# Patient Record
Sex: FEMALE | Race: WHITE | NOT HISPANIC OR LATINO | URBAN - NONMETROPOLITAN AREA
[De-identification: names, ages, dates, MRNs, and addresses within clinical notes are randomized per-mention and may not be internally consistent; named-entity substitution may affect disease eponyms.]

---

## 2021-03-16 ENCOUNTER — OFFICE VISIT (OUTPATIENT)
Dept: URBAN - NONMETROPOLITAN AREA CLINIC 2 | Facility: CLINIC | Age: 76
End: 2021-03-16
Payer: MEDICARE

## 2021-03-16 DIAGNOSIS — R14.0 BLOATING: ICD-10-CM

## 2021-03-16 DIAGNOSIS — K58.0 IRRITABLE BOWEL SYNDROME WITH DIARRHEA: ICD-10-CM

## 2021-03-16 DIAGNOSIS — K57.50 DIVERTICULOSIS OF BOTH SMALL AND LARGE INTESTINE: ICD-10-CM

## 2021-03-16 PROCEDURE — 99203 OFFICE O/P NEW LOW 30 MIN: CPT | Performed by: INTERNAL MEDICINE

## 2021-03-16 PROCEDURE — 99243 OFF/OP CNSLTJ NEW/EST LOW 30: CPT | Performed by: INTERNAL MEDICINE

## 2021-03-16 RX ORDER — RIFAXIMIN 550 MG/1
1 TABLET TABLET ORAL THREE TIMES A DAY
Qty: 42 TABLET | Refills: 3 | OUTPATIENT
Start: 2021-03-16 | End: 2021-05-11

## 2021-03-16 NOTE — HPI-TODAY'S VISIT:
3-16-21 The patient is a 75-year-old female who presents today for problems with IBS and diarrhea.  She was previously being seen by Dr. Ramses Sotelo.  He did a colonoscopy on her several years ago, but biopsies were not performed at that time.  She had been maintained on Viberzi, but she felt that this was causing too much constipation.  She actually became so constipated, that she had to go to the emergency room.  She stopped the Viberzi, and she was told to take it as needed.  This was not helping with her symptoms.  She is now having several hard bowel movements followed by urgent diarrhea.  She denies any blood in her stool.  She denies any weight loss.  She has never been on a specific FODMAP diet, but she has tried to eliminate some gluten and dairy.  She feels like she tries to eat fairly healthy.  She has never been tried on dicyclomine.  She has been on amitriptyline, and this did cause her some dizziness.  She also complains of bloating and gas today.  She is not taking any fiber on a regular basis.  She does take a probiotic occasionally.  She feels that IBgard helps with her symptoms more than any of the other medications that she has tried.  Overall, she is here today for second opinion.  She has had problems with IBS and diarrhea her entire life.  She was referred to [place] by Dr. Sotelo, but this never happened due to the pandemic.  We have discussed various treatment options today in detail.  She is willing to try a trial of Xifaxan.  It does not appear that she was ever ruled out for microscopic colitis, so we have discussed this today as well.

## 2021-05-11 ENCOUNTER — OFFICE VISIT (OUTPATIENT)
Dept: URBAN - NONMETROPOLITAN AREA CLINIC 2 | Facility: CLINIC | Age: 76
End: 2021-05-11
Payer: MEDICARE

## 2021-05-11 DIAGNOSIS — R14.0 BLOATING: ICD-10-CM

## 2021-05-11 DIAGNOSIS — K58.0 IRRITABLE BOWEL SYNDROME WITH DIARRHEA: ICD-10-CM

## 2021-05-11 DIAGNOSIS — K57.90 DIVERTICULOSIS: ICD-10-CM

## 2021-05-11 DIAGNOSIS — Z12.11 SCREENING FOR COLON CANCER: ICD-10-CM

## 2021-05-11 PROCEDURE — 99214 OFFICE O/P EST MOD 30 MIN: CPT | Performed by: INTERNAL MEDICINE

## 2021-05-11 RX ORDER — LEVOTHYROXINE SODIUM 0.09 MG/1
(PRIOR AUTH#:000008937753) TABLET ORAL
Qty: 90 DELAYED RELEASE TABLET | Status: ACTIVE | COMMUNITY

## 2021-05-11 RX ORDER — PANTOPRAZOLE SODIUM 40 MG/1
(PRIOR AUTH#:000001165383) TABLET, DELAYED RELEASE ORAL
Qty: 90 DELAYED RELEASE TABLET | Status: ACTIVE | COMMUNITY

## 2021-05-11 RX ORDER — OLMESARTAN MEDOXOMIL AND HYDROCHLOROTHIAZIDE 40; 12.5 MG/1; MG/1
(PRIOR AUTH#:000009179771) TABLET ORAL
Qty: 90 DELAYED RELEASE TABLET | Status: ACTIVE | COMMUNITY

## 2021-05-11 RX ORDER — RIFAXIMIN 550 MG/1
1 TABLET TABLET ORAL THREE TIMES A DAY
Qty: 42 TABLET | Refills: 3 | Status: ON HOLD | COMMUNITY
Start: 2021-03-16 | End: 2021-05-11

## 2021-05-11 NOTE — HPI-TODAY'S VISIT:
3-16-21 The patient is a 75-year-old female who presents today for problems with IBS and diarrhea.  She was previously being seen by Dr. Ramses Sotelo.  He did a colonoscopy on her several years ago, but biopsies were not performed at that time.  She had been maintained on Viberzi, but she felt that this was causing too much constipation.  She actually became so constipated, that she had to go to the emergency room.  She stopped the Viberzi, and she was told to take it as needed.  This was not helping with her symptoms.  She is now having several hard bowel movements followed by urgent diarrhea.  She denies any blood in her stool.  She denies any weight loss.  She has never been on a specific FODMAP diet, but she has tried to eliminate some gluten and dairy.  She feels like she tries to eat fairly healthy.  She has never been tried on dicyclomine.  She has been on amitriptyline, and this did cause her some dizziness.  She also complains of bloating and gas today.  She is not taking any fiber on a regular basis.  She does take a probiotic occasionally.  She feels that IBgard helps with her symptoms more than any of the other medications that she has tried.  Overall, she is here today for second opinion.  She has had problems with IBS and diarrhea her entire life.  She was referred to [place] by Dr. Sotelo, but this never happened due to the pandemic.  We have discussed various treatment options today in detail.  She is willing to try a trial of Xifaxan.  It does not appear that she was ever ruled out for microscopic colitis, so we have discussed this today as well. 5-11-21 The patient presents today for follow-up of her IBS and diarrhea.  Since our last visit, she did take Xifaxan.  Her diarrhea has improved.  Now her stools have changed to where she is having several hard balls throughout the day.  She has multiple bowel movements throughout the day, and she never feels that she has a complete bowel movement.  She is taking 1 Metamucil tablet in the morning, but she forgets to take her Metamucil at night.  We have discussed adding MiraLAX in the morning along with increasing her Metamucil at night.  She is in agreement with this plan today.  Overall, she is feeling much better.  We have also discussed diet, but she is meeting with her primary care physician.  Her cholesterol has been elevated.

## 2021-05-14 ENCOUNTER — WEB ENCOUNTER (OUTPATIENT)
Dept: URBAN - NONMETROPOLITAN AREA CLINIC 2 | Facility: CLINIC | Age: 76
End: 2021-05-14

## 2021-05-14 RX ORDER — PANTOPRAZOLE SODIUM 40 MG/1
1 TABLET TABLET, DELAYED RELEASE ORAL ONCE A DAY
Qty: 90 TABLET | Refills: 3

## 2021-06-15 ENCOUNTER — OFFICE VISIT (OUTPATIENT)
Dept: URBAN - NONMETROPOLITAN AREA CLINIC 2 | Facility: CLINIC | Age: 76
End: 2021-06-15

## 2021-06-15 ENCOUNTER — OFFICE VISIT (OUTPATIENT)
Dept: URBAN - METROPOLITAN AREA TELEHEALTH 2 | Facility: TELEHEALTH | Age: 76
End: 2021-06-15
Payer: MEDICARE

## 2021-06-15 DIAGNOSIS — K58.9 IBS (IRRITABLE BOWEL SYNDROME): ICD-10-CM

## 2021-06-15 DIAGNOSIS — E78.5 HYPERLIPIDEMIA: ICD-10-CM

## 2021-06-15 DIAGNOSIS — R74.8 ABNORMAL LIVER ENZYMES: ICD-10-CM

## 2021-06-15 PROCEDURE — 97802 MEDICAL NUTRITION INDIV IN: CPT | Performed by: DIETITIAN, REGISTERED

## 2021-06-15 RX ORDER — LEVOTHYROXINE SODIUM 0.09 MG/1
(PRIOR AUTH#:000008937753) TABLET ORAL
Qty: 90 DELAYED RELEASE TABLET | Status: ACTIVE | COMMUNITY

## 2021-06-15 RX ORDER — PANTOPRAZOLE SODIUM 40 MG/1
1 TABLET TABLET, DELAYED RELEASE ORAL ONCE A DAY
Qty: 90 TABLET | Refills: 3 | Status: ACTIVE | COMMUNITY

## 2021-06-15 RX ORDER — OLMESARTAN MEDOXOMIL AND HYDROCHLOROTHIAZIDE 40; 12.5 MG/1; MG/1
(PRIOR AUTH#:000009179771) TABLET ORAL
Qty: 90 DELAYED RELEASE TABLET | Status: ACTIVE | COMMUNITY

## 2021-06-16 ENCOUNTER — TELEPHONE ENCOUNTER (OUTPATIENT)
Dept: URBAN - NONMETROPOLITAN AREA CLINIC 2 | Facility: CLINIC | Age: 76
End: 2021-06-16

## 2021-06-16 RX ORDER — LEVOTHYROXINE SODIUM 0.09 MG/1
(PRIOR AUTH#:000008937753) TABLET ORAL
Qty: 90 DELAYED RELEASE TABLET | Status: ACTIVE | COMMUNITY

## 2021-06-16 RX ORDER — OLMESARTAN MEDOXOMIL AND HYDROCHLOROTHIAZIDE 40; 12.5 MG/1; MG/1
(PRIOR AUTH#:000009179771) TABLET ORAL
Qty: 90 DELAYED RELEASE TABLET | Status: ACTIVE | COMMUNITY

## 2021-06-16 RX ORDER — PANTOPRAZOLE SODIUM 40 MG/1
1 TABLET TABLET, DELAYED RELEASE ORAL ONCE A DAY
Qty: 90 TABLET | Refills: 3 | Status: ACTIVE | COMMUNITY

## 2021-06-16 RX ORDER — HYDROCORTISONE ACETATE 25 MG/1
1 SUPPOSITORY SUPPOSITORY RECTAL THREE TIMES A DAY
Qty: 42 SUPPOSITORIES | Refills: 1 | OUTPATIENT
Start: 2021-06-16 | End: 2021-07-14

## 2021-07-13 ENCOUNTER — TELEPHONE ENCOUNTER (OUTPATIENT)
Dept: URBAN - METROPOLITAN AREA CLINIC 23 | Facility: CLINIC | Age: 76
End: 2021-07-13

## 2021-07-13 RX ORDER — LEVOTHYROXINE SODIUM 0.09 MG/1
(PRIOR AUTH#:000008937753) TABLET ORAL
Qty: 90 DELAYED RELEASE TABLET | Status: ACTIVE | COMMUNITY

## 2021-07-13 RX ORDER — OLMESARTAN MEDOXOMIL AND HYDROCHLOROTHIAZIDE 40; 12.5 MG/1; MG/1
(PRIOR AUTH#:000009179771) TABLET ORAL
Qty: 90 DELAYED RELEASE TABLET | Status: ACTIVE | COMMUNITY

## 2021-07-13 RX ORDER — PANTOPRAZOLE SODIUM 40 MG/1
1 TABLET TABLET, DELAYED RELEASE ORAL ONCE A DAY
Qty: 90 TABLET | Refills: 3 | Status: ACTIVE | COMMUNITY

## 2021-07-13 RX ORDER — RIFAXIMIN 550 MG/1
1 TABLET TABLET ORAL THREE TIMES A DAY
Qty: 42 TABLET | Refills: 2 | OUTPATIENT
Start: 2021-07-14 | End: 2021-08-25

## 2021-07-13 RX ORDER — HYDROCORTISONE ACETATE 25 MG/1
1 SUPPOSITORY SUPPOSITORY RECTAL THREE TIMES A DAY
Qty: 42 SUPPOSITORIES | Refills: 1 | Status: ACTIVE | COMMUNITY
Start: 2021-06-16 | End: 2021-07-14

## 2021-07-20 ENCOUNTER — OFFICE VISIT (OUTPATIENT)
Dept: URBAN - NONMETROPOLITAN AREA CLINIC 2 | Facility: CLINIC | Age: 76
End: 2021-07-20

## 2021-07-20 ENCOUNTER — TELEPHONE ENCOUNTER (OUTPATIENT)
Dept: URBAN - NONMETROPOLITAN AREA CLINIC 2 | Facility: CLINIC | Age: 76
End: 2021-07-20

## 2021-07-20 RX ORDER — RIFAXIMIN 550 MG/1
1 TABLET TABLET ORAL THREE TIMES A DAY
Qty: 42 TABLET | Refills: 2
Start: 2021-07-14 | End: 2021-08-31

## 2021-09-09 ENCOUNTER — OFFICE VISIT (OUTPATIENT)
Dept: URBAN - NONMETROPOLITAN AREA CLINIC 2 | Facility: CLINIC | Age: 76
End: 2021-09-09
Payer: MEDICARE

## 2021-09-09 ENCOUNTER — WEB ENCOUNTER (OUTPATIENT)
Dept: URBAN - NONMETROPOLITAN AREA CLINIC 2 | Facility: CLINIC | Age: 76
End: 2021-09-09

## 2021-09-09 VITALS
SYSTOLIC BLOOD PRESSURE: 121 MMHG | DIASTOLIC BLOOD PRESSURE: 74 MMHG | TEMPERATURE: 97.6 F | BODY MASS INDEX: 19.29 KG/M2 | HEART RATE: 68 BPM | HEIGHT: 64 IN | WEIGHT: 113 LBS

## 2021-09-09 DIAGNOSIS — Z12.11 SCREENING FOR COLON CANCER: ICD-10-CM

## 2021-09-09 DIAGNOSIS — K57.30 ACQUIRED DIVERTICULOSIS OF COLON: ICD-10-CM

## 2021-09-09 DIAGNOSIS — R14.0 BLOATING: ICD-10-CM

## 2021-09-09 DIAGNOSIS — K58.0 IRRITABLE BOWEL SYNDROME WITH DIARRHEA: ICD-10-CM

## 2021-09-09 DIAGNOSIS — K57.90 DIVERTICULOSIS: ICD-10-CM

## 2021-09-09 PROCEDURE — 99214 OFFICE O/P EST MOD 30 MIN: CPT | Performed by: NURSE PRACTITIONER

## 2021-09-09 RX ORDER — OLMESARTAN MEDOXOMIL AND HYDROCHLOROTHIAZIDE 40; 12.5 MG/1; MG/1
(PRIOR AUTH#:000009179771) TABLET ORAL
Qty: 90 DELAYED RELEASE TABLET | Status: ACTIVE | COMMUNITY

## 2021-09-09 RX ORDER — AMITRIPTYLINE HYDROCHLORIDE 10 MG/1
1 TABLET AT BEDTIME TABLET, FILM COATED ORAL ONCE A DAY
Qty: 90 TABLET | Refills: 3 | OUTPATIENT
Start: 2021-09-09

## 2021-09-09 RX ORDER — LEVOTHYROXINE SODIUM 0.09 MG/1
(PRIOR AUTH#:000008937753) TABLET ORAL
Qty: 90 DELAYED RELEASE TABLET | Status: ACTIVE | COMMUNITY

## 2021-09-09 RX ORDER — PANTOPRAZOLE SODIUM 40 MG/1
1 TABLET TABLET, DELAYED RELEASE ORAL ONCE A DAY
Qty: 90 TABLET | Refills: 3 | Status: ACTIVE | COMMUNITY

## 2021-09-09 NOTE — HPI-TODAY'S VISIT:
3-16-21 The patient is a 75-year-old female who presents today for problems with IBS and diarrhea.  She was previously being seen by Dr. Ramses Sotelo.  He did a colonoscopy on her several years ago, but biopsies were not performed at that time.  She had been maintained on Viberzi, but she felt that this was causing too much constipation.  She actually became so constipated, that she had to go to the emergency room.  She stopped the Viberzi, and she was told to take it as needed.  This was not helping with her symptoms.  She is now having several hard bowel movements followed by urgent diarrhea.  She denies any blood in her stool.  She denies any weight loss.  She has never been on a specific FODMAP diet, but she has tried to eliminate some gluten and dairy.  She feels like she tries to eat fairly healthy.  She has never been tried on dicyclomine.  She has been on amitriptyline, and this did cause her some dizziness.  She also complains of bloating and gas today.  She is not taking any fiber on a regular basis.  She does take a probiotic occasionally.  She feels that IBgard helps with her symptoms more than any of the other medications that she has tried.  Overall, she is here today for second opinion.  She has had problems with IBS and diarrhea her entire life.  She was referred to [place] by Dr. Sotelo, but this never happened due to the pandemic.  We have discussed various treatment options today in detail.  She is willing to try a trial of Xifaxan.  It does not appear that she was ever ruled out for microscopic colitis, so we have discussed this today as well. 5-11-21 The patient presents today for follow-up of her IBS and diarrhea.  Since our last visit, she did take Xifaxan.  Her diarrhea has improved.  Now her stools have changed to where she is having several hard balls throughout the day.  She has multiple bowel movements throughout the day, and she never feels that she has a complete bowel movement.  She is taking 1 Metamucil tablet in the morning, but she forgets to take her Metamucil at night.  We have discussed adding MiraLAX in the morning along with increasing her Metamucil at night.  She is in agreement with this plan today.  Overall, she is feeling much better.  We have also discussed diet, but she is meeting with her primary care physician.  Her cholesterol has been elevated.  9/9/2021 Mrs. Truong presents for follow-up with IBS.  Since her last visit she started Metamucil twice daily and MiraLAX daily.  She subsequently developed urgent diffuse diarrhea with up to 10 bowel movements daily earlier this week.  She stopped the MiraLAX and took Imodium and did not have a bowel movement today.  She is taken Xifaxan twice and did not feel that it helped significantly.  She has been food journaling with no identifiable trigger to her symptoms.  Today we have had a long discussion regarding her symptoms.  Her IBS truly seems mixed.  She is taking her fiber daily.  She has never tried amitriptyline in the past.  She agrees to pursue colonoscopy if no relief.  MB

## 2021-09-14 ENCOUNTER — OFFICE VISIT (OUTPATIENT)
Dept: URBAN - NONMETROPOLITAN AREA CLINIC 2 | Facility: CLINIC | Age: 76
End: 2021-09-14

## 2021-11-02 ENCOUNTER — TELEPHONE ENCOUNTER (OUTPATIENT)
Dept: URBAN - NONMETROPOLITAN AREA CLINIC 13 | Facility: CLINIC | Age: 76
End: 2021-11-02

## 2021-11-10 ENCOUNTER — WEB ENCOUNTER (OUTPATIENT)
Dept: URBAN - NONMETROPOLITAN AREA CLINIC 2 | Facility: CLINIC | Age: 76
End: 2021-11-10

## 2021-11-10 RX ORDER — AMITRIPTYLINE HYDROCHLORIDE 25 MG/1
1 TABLET AT BEDTIME TABLET, FILM COATED ORAL ONCE A DAY
Qty: 90 TABLET | Refills: 3
Start: 2021-09-09

## 2022-02-22 ENCOUNTER — OFFICE VISIT (OUTPATIENT)
Dept: URBAN - NONMETROPOLITAN AREA CLINIC 2 | Facility: CLINIC | Age: 77
End: 2022-02-22

## 2022-02-24 ENCOUNTER — TELEPHONE ENCOUNTER (OUTPATIENT)
Dept: URBAN - NONMETROPOLITAN AREA CLINIC 2 | Facility: CLINIC | Age: 77
End: 2022-02-24

## 2022-02-24 RX ORDER — LEVOTHYROXINE SODIUM 0.09 MG/1
(PRIOR AUTH#:000008937753) TABLET ORAL
Qty: 90 DELAYED RELEASE TABLET | Status: ACTIVE | COMMUNITY

## 2022-02-24 RX ORDER — PANTOPRAZOLE SODIUM 40 MG/1
1 TABLET TABLET, DELAYED RELEASE ORAL ONCE A DAY
Qty: 90 TABLET | Refills: 3 | Status: ACTIVE | COMMUNITY

## 2022-02-24 RX ORDER — OLMESARTAN MEDOXOMIL AND HYDROCHLOROTHIAZIDE 40; 12.5 MG/1; MG/1
(PRIOR AUTH#:000009179771) TABLET ORAL
Qty: 90 DELAYED RELEASE TABLET | Status: ACTIVE | COMMUNITY

## 2022-02-24 RX ORDER — HYDROCORTISONE 25 MG/G
1 APPLICATION CREAM TOPICAL TWICE A DAY
Qty: 1 TUBE | Refills: 1 | OUTPATIENT
Start: 2022-02-24 | End: 2022-03-24

## 2022-02-24 RX ORDER — AMITRIPTYLINE HYDROCHLORIDE 25 MG/1
1 TABLET AT BEDTIME TABLET, FILM COATED ORAL ONCE A DAY
Qty: 90 TABLET | Refills: 3 | Status: ACTIVE | COMMUNITY
Start: 2021-09-09

## 2022-02-25 ENCOUNTER — OFFICE VISIT (OUTPATIENT)
Dept: URBAN - NONMETROPOLITAN AREA CLINIC 13 | Facility: CLINIC | Age: 77
End: 2022-02-25

## 2022-03-15 ENCOUNTER — LAB OUTSIDE AN ENCOUNTER (OUTPATIENT)
Dept: URBAN - NONMETROPOLITAN AREA CLINIC 2 | Facility: CLINIC | Age: 77
End: 2022-03-15

## 2022-03-15 ENCOUNTER — OFFICE VISIT (OUTPATIENT)
Dept: URBAN - NONMETROPOLITAN AREA CLINIC 2 | Facility: CLINIC | Age: 77
End: 2022-03-15
Payer: MEDICARE

## 2022-03-15 DIAGNOSIS — K57.30 ACQUIRED DIVERTICULOSIS OF COLON: ICD-10-CM

## 2022-03-15 DIAGNOSIS — K58.0 IRRITABLE BOWEL SYNDROME WITH DIARRHEA: ICD-10-CM

## 2022-03-15 DIAGNOSIS — Z12.11 SCREENING FOR COLON CANCER: ICD-10-CM

## 2022-03-15 DIAGNOSIS — R14.0 BLOATING: ICD-10-CM

## 2022-03-15 PROCEDURE — 99214 OFFICE O/P EST MOD 30 MIN: CPT | Performed by: NURSE PRACTITIONER

## 2022-03-15 RX ORDER — ROSUVASTATIN CALCIUM 5 MG/1
1 TABLET TABLET, FILM COATED ORAL ONCE A DAY
Status: ACTIVE | COMMUNITY

## 2022-03-15 RX ORDER — AMITRIPTYLINE HYDROCHLORIDE 25 MG/1
1 TABLET AT BEDTIME TABLET, FILM COATED ORAL ONCE A DAY
Qty: 90 TABLET | Refills: 3 | Status: ACTIVE | COMMUNITY
Start: 2021-09-09

## 2022-03-15 RX ORDER — HYDROCORTISONE 25 MG/G
1 APPLICATION CREAM TOPICAL TWICE A DAY
Qty: 1 TUBE | Refills: 1 | Status: ACTIVE | COMMUNITY
Start: 2022-02-24 | End: 2022-03-24

## 2022-03-15 RX ORDER — OLMESARTAN MEDOXOMIL AND HYDROCHLOROTHIAZIDE 40; 12.5 MG/1; MG/1
(PRIOR AUTH#:000009179771) TABLET ORAL
Qty: 90 DELAYED RELEASE TABLET | Status: ACTIVE | COMMUNITY

## 2022-03-15 RX ORDER — LEVOTHYROXINE SODIUM 0.09 MG/1
(PRIOR AUTH#:000008937753) TABLET ORAL
Qty: 90 DELAYED RELEASE TABLET | Status: ACTIVE | COMMUNITY

## 2022-03-15 RX ORDER — PANTOPRAZOLE SODIUM 40 MG/1
1 TABLET TABLET, DELAYED RELEASE ORAL ONCE A DAY
Qty: 90 TABLET | Refills: 3 | Status: ACTIVE | COMMUNITY

## 2022-03-15 RX ORDER — SODIUM PICOSULFATE, MAGNESIUM OXIDE, AND ANHYDROUS CITRIC ACID 10; 3.5; 12 MG/160ML; G/160ML; G/160ML
160 ML LIQUID ORAL
Qty: 320 MILLILITER | Refills: 0 | OUTPATIENT
Start: 2022-03-15 | End: 2022-03-16

## 2022-03-15 NOTE — HPI-TODAY'S VISIT:
3-16-21 The patient is a 75-year-old female who presents today for problems with IBS and diarrhea.  She was previously being seen by Dr. Ramses Sotelo.  He did a colonoscopy on her several years ago, but biopsies were not performed at that time.  She had been maintained on Viberzi, but she felt that this was causing too much constipation.  She actually became so constipated, that she had to go to the emergency room.  She stopped the Viberzi, and she was told to take it as needed.  This was not helping with her symptoms.  She is now having several hard bowel movements followed by urgent diarrhea.  She denies any blood in her stool.  She denies any weight loss.  She has never been on a specific FODMAP diet, but she has tried to eliminate some gluten and dairy.  She feels like she tries to eat fairly healthy.  She has never been tried on dicyclomine.  She has been on amitriptyline, and this did cause her some dizziness.  She also complains of bloating and gas today.  She is not taking any fiber on a regular basis.  She does take a probiotic occasionally.  She feels that IBgard helps with her symptoms more than any of the other medications that she has tried.  Overall, she is here today for second opinion.  She has had problems with IBS and diarrhea her entire life.  She was referred to [place] by Dr. Sotelo, but this never happened due to the pandemic.  We have discussed various treatment options today in detail.  She is willing to try a trial of Xifaxan.  It does not appear that she was ever ruled out for microscopic colitis, so we have discussed this today as well. 5-11-21 The patient presents today for follow-up of her IBS and diarrhea.  Since our last visit, she did take Xifaxan.  Her diarrhea has improved.  Now her stools have changed to where she is having several hard balls throughout the day.  She has multiple bowel movements throughout the day, and she never feels that she has a complete bowel movement.  She is taking 1 Metamucil tablet in the morning, but she forgets to take her Metamucil at night.  We have discussed adding MiraLAX in the morning along with increasing her Metamucil at night.  She is in agreement with this plan today.  Overall, she is feeling much better.  We have also discussed diet, but she is meeting with her primary care physician.  Her cholesterol has been elevated.  9/9/2021 Mrs. Truong presents for follow-up with IBS.  Since her last visit she started Metamucil twice daily and MiraLAX daily.  She subsequently developed urgent diffuse diarrhea with up to 10 bowel movements daily earlier this week.  She stopped the MiraLAX and took Imodium and did not have a bowel movement today.  She is taken Xifaxan twice and did not feel that it helped significantly.  She has been food journaling with no identifiable trigger to her symptoms.  Today we have had a long discussion regarding her symptoms.  Her IBS truly seems mixed.  She is taking her fiber daily.  She has never tried amitriptyline in the past.  She agrees to pursue colonoscopy if no relief.  MB 3/15/2022 Marisel presents for follow-up of irritable bowel syndrome with diarrhea predominance.  Since her last visit she started the amitriptyline 10 mg at night and we actually increased to 25 mg nightly.  She continues to have multiple urgent bowel movements usually in the morning.  She has failed Xifaxan, dicyclomine, Levsin in the past.  She is on Florastor daily.  Today we discussed her differential, I am concerned about microscopic colitis.  She agrees to repeat blood work and colonoscopy with random biopsies.  MB

## 2022-03-17 ENCOUNTER — TELEPHONE ENCOUNTER (OUTPATIENT)
Dept: URBAN - METROPOLITAN AREA CLINIC 92 | Facility: CLINIC | Age: 77
End: 2022-03-17

## 2022-03-17 LAB
A/G RATIO: 1.8
ALBUMIN: 5.1
ALKALINE PHOSPHATASE: 60
ALT (SGPT): 19
AST (SGOT): 26
BASO (ABSOLUTE): 0
BASOS: 1
BILIRUBIN, TOTAL: 0.4
BUN/CREATININE RATIO: 18
BUN: 13
C-REACTIVE PROTEIN, QUANT: <1
CALCIUM: 9.9
CARBON DIOXIDE, TOTAL: 25
CHLORIDE: 99
CREATININE: 0.71
DEAMIDATED GLIADIN ABS, IGA: 4
DEAMIDATED GLIADIN ABS, IGG: 3
EGFR: 89
ENDOMYSIAL ANTIBODY IGA: NEGATIVE
EOS (ABSOLUTE): 0.1
EOS: 1
GLOBULIN, TOTAL: 2.8
GLUCOSE: 94
H PYLORI, IGM ABS: <9
H. PYLORI, IGA ABS: <9
HEMATOCRIT: 39.6
HEMATOLOGY COMMENTS:: (no result)
HEMOGLOBIN: 13.2
IMMATURE CELLS: (no result)
IMMATURE GRANS (ABS): 0
IMMATURE GRANULOCYTES: 0
IMMUNOGLOBULIN A, QN, SERUM: 206
LYMPHS (ABSOLUTE): 1.8
LYMPHS: 38
MCH: 31.7
MCHC: 33.3
MCV: 95
MONOCYTES(ABSOLUTE): 0.3
MONOCYTES: 7
NEUTROPHILS (ABSOLUTE): 2.5
NEUTROPHILS: 53
NRBC: (no result)
PLATELETS: 171
POTASSIUM: 4.2
PROTEIN, TOTAL: 7.9
RBC: 4.16
RDW: 11.9
SEDIMENTATION RATE-WESTERGREN: 2
SODIUM: 141
T-TRANSGLUTAMINASE (TTG) IGA: <2
T-TRANSGLUTAMINASE (TTG) IGG: <2
T4,FREE(DIRECT): 1.26
TSH: 0.4
WBC: 4.7

## 2022-03-21 ENCOUNTER — OFFICE VISIT (OUTPATIENT)
Dept: URBAN - NONMETROPOLITAN AREA SURGERY CENTER 1 | Facility: SURGERY CENTER | Age: 77
End: 2022-03-21
Payer: MEDICARE

## 2022-03-21 ENCOUNTER — CLAIMS CREATED FROM THE CLAIM WINDOW (OUTPATIENT)
Dept: URBAN - METROPOLITAN AREA CLINIC 4 | Facility: CLINIC | Age: 77
End: 2022-03-21
Payer: MEDICARE

## 2022-03-21 DIAGNOSIS — R19.7 ACUTE DIARRHEA: ICD-10-CM

## 2022-03-21 DIAGNOSIS — K63.89 CYST OF DUODENUM: ICD-10-CM

## 2022-03-21 PROCEDURE — G8907 PT DOC NO EVENTS ON DISCHARG: HCPCS | Performed by: INTERNAL MEDICINE

## 2022-03-21 PROCEDURE — 88313 SPECIAL STAINS GROUP 2: CPT | Performed by: PATHOLOGY

## 2022-03-21 PROCEDURE — 88342 IMHCHEM/IMCYTCHM 1ST ANTB: CPT | Performed by: PATHOLOGY

## 2022-03-21 PROCEDURE — 88305 TISSUE EXAM BY PATHOLOGIST: CPT | Performed by: PATHOLOGY

## 2022-03-21 PROCEDURE — 45380 COLONOSCOPY AND BIOPSY: CPT | Performed by: INTERNAL MEDICINE

## 2022-04-29 ENCOUNTER — OFFICE VISIT (OUTPATIENT)
Dept: URBAN - NONMETROPOLITAN AREA CLINIC 2 | Facility: CLINIC | Age: 77
End: 2022-04-29
Payer: MEDICARE

## 2022-04-29 ENCOUNTER — LAB OUTSIDE AN ENCOUNTER (OUTPATIENT)
Dept: URBAN - NONMETROPOLITAN AREA CLINIC 2 | Facility: CLINIC | Age: 77
End: 2022-04-29

## 2022-04-29 VITALS
BODY MASS INDEX: 19.29 KG/M2 | TEMPERATURE: 97.6 F | SYSTOLIC BLOOD PRESSURE: 145 MMHG | WEIGHT: 113 LBS | HEART RATE: 80 BPM | HEIGHT: 64 IN | DIASTOLIC BLOOD PRESSURE: 79 MMHG

## 2022-04-29 DIAGNOSIS — K57.90 DIVERTICULOSIS: ICD-10-CM

## 2022-04-29 DIAGNOSIS — R14.0 BLOATING: ICD-10-CM

## 2022-04-29 DIAGNOSIS — Z12.11 SCREENING FOR COLON CANCER: ICD-10-CM

## 2022-04-29 DIAGNOSIS — K58.0 IRRITABLE BOWEL SYNDROME WITH DIARRHEA: ICD-10-CM

## 2022-04-29 DIAGNOSIS — R10.9 ABDOMINAL CRAMPING: ICD-10-CM

## 2022-04-29 PROCEDURE — 99214 OFFICE O/P EST MOD 30 MIN: CPT | Performed by: NURSE PRACTITIONER

## 2022-04-29 RX ORDER — AMITRIPTYLINE HYDROCHLORIDE 25 MG/1
1 TABLET AT BEDTIME TABLET, FILM COATED ORAL ONCE A DAY
Qty: 90 TABLET | Refills: 3 | Status: ACTIVE | COMMUNITY
Start: 2021-09-09

## 2022-04-29 RX ORDER — ROSUVASTATIN CALCIUM 5 MG/1
1 TABLET TABLET, FILM COATED ORAL ONCE A DAY
Status: ACTIVE | COMMUNITY

## 2022-04-29 RX ORDER — AMITRIPTYLINE HYDROCHLORIDE 25 MG/1
1 TABLET AT BEDTIME TABLET, FILM COATED ORAL ONCE A DAY
Qty: 90 TABLET | Refills: 3
Start: 2021-09-09

## 2022-04-29 RX ORDER — OLMESARTAN MEDOXOMIL AND HYDROCHLOROTHIAZIDE 40; 12.5 MG/1; MG/1
(PRIOR AUTH#:000009179771) TABLET ORAL
Qty: 90 DELAYED RELEASE TABLET | Status: ACTIVE | COMMUNITY

## 2022-04-29 RX ORDER — PANTOPRAZOLE SODIUM 40 MG/1
1 TABLET TABLET, DELAYED RELEASE ORAL ONCE A DAY
Qty: 90 TABLET | Refills: 3 | Status: ACTIVE | COMMUNITY

## 2022-04-29 RX ORDER — LEVOTHYROXINE SODIUM 0.09 MG/1
(PRIOR AUTH#:000008937753) TABLET ORAL
Qty: 90 DELAYED RELEASE TABLET | Status: ACTIVE | COMMUNITY

## 2022-04-29 NOTE — HPI-TODAY'S VISIT:
3-16-21 The patient is a 75-year-old female who presents today for problems with IBS and diarrhea.  She was previously being seen by Dr. Ramses Sotelo.  He did a colonoscopy on her several years ago, but biopsies were not performed at that time.  She had been maintained on Viberzi, but she felt that this was causing too much constipation.  She actually became so constipated, that she had to go to the emergency room.  She stopped the Viberzi, and she was told to take it as needed.  This was not helping with her symptoms.  She is now having several hard bowel movements followed by urgent diarrhea.  She denies any blood in her stool.  She denies any weight loss.  She has never been on a specific FODMAP diet, but she has tried to eliminate some gluten and dairy.  She feels like she tries to eat fairly healthy.  She has never been tried on dicyclomine.  She has been on amitriptyline, and this did cause her some dizziness.  She also complains of bloating and gas today.  She is not taking any fiber on a regular basis.  She does take a probiotic occasionally.  She feels that IBgard helps with her symptoms more than any of the other medications that she has tried.  Overall, she is here today for second opinion.  She has had problems with IBS and diarrhea her entire life.  She was referred to [place] by Dr. Sotelo, but this never happened due to the pandemic.  We have discussed various treatment options today in detail.  She is willing to try a trial of Xifaxan.  It does not appear that she was ever ruled out for microscopic colitis, so we have discussed this today as well. 5-11-21 The patient presents today for follow-up of her IBS and diarrhea.  Since our last visit, she did take Xifaxan.  Her diarrhea has improved.  Now her stools have changed to where she is having several hard balls throughout the day.  She has multiple bowel movements throughout the day, and she never feels that she has a complete bowel movement.  She is taking 1 Metamucil tablet in the morning, but she forgets to take her Metamucil at night.  We have discussed adding MiraLAX in the morning along with increasing her Metamucil at night.  She is in agreement with this plan today.  Overall, she is feeling much better.  We have also discussed diet, but she is meeting with her primary care physician.  Her cholesterol has been elevated.  9/9/2021 Mrs. Truong presents for follow-up with IBS.  Since her last visit she started Metamucil twice daily and MiraLAX daily.  She subsequently developed urgent diffuse diarrhea with up to 10 bowel movements daily earlier this week.  She stopped the MiraLAX and took Imodium and did not have a bowel movement today.  She is taken Xifaxan twice and did not feel that it helped significantly.  She has been food journaling with no identifiable trigger to her symptoms.  Today we have had a long discussion regarding her symptoms.  Her IBS truly seems mixed.  She is taking her fiber daily.  She has never tried amitriptyline in the past.  She agrees to pursue colonoscopy if no relief.  MB 3/15/2022 Marisel presents for follow-up of irritable bowel syndrome with diarrhea predominance.  Since her last visit she started the amitriptyline 10 mg at night and we actually increased to 25 mg nightly.  She continues to have multiple urgent bowel movements usually in the morning.  She has failed Xifaxan, dicyclomine, Levsin in the past.  She is on Florastor daily.  Today we discussed her differential, I am concerned about microscopic colitis.  She agrees to repeat blood work and colonoscopy with random biopsies.  MB 4/29/2022 Mrs. Truong presents for follow-up of bloating, abdominal pain, and alternating constipation and diarrhea.  Since her last visit her repeat colonoscopy is normal with left-sided diverticular disease and normal random biopsies.  She is on amitriptyline 25 mg at night.  She states at times her stools are hard, she would then have these episodes of abdominal cramping with urgency and diarrhea.  Imodium does help but this seems to wipe her out for the day.  She is failed multiple medications in the past, she states that she cannot tolerate psyllium.  Viberzi helped in the distant past with Dr. Sotelo but this ultimately caused constipation and abdominal pain.  She does still have her gallbladder.  Her last EGD was done by Dr. Sotelo several years ago.  She has increased bloating as well.  She is on pantoprazole 40 mg daily.  She also takes olmesartan.  Today we have discussed her differential.  I suspect she still has a component of IBS mixed particularly since the amitriptyline 25 mg at night is the only thing that is ever helped her abdominal pain.  Her labs are normal, her colonoscopy is normal.  She declines repeat upper endoscopy for today.  She does agree to an ultrasound of her gallbladder.  She is concerned about EPI, we have discussed checking her stool studies for parasites and fecal elastase.  If her work-up is normal she would like to repeat a course of Xifaxan however she cannot tolerate 14 days, we did discuss a trial of a 3-day course.  Today she is doing about the same.  She continues to climb plaint of abdominal pain cramping and bowel regularity.  MB

## 2022-05-11 ENCOUNTER — TELEPHONE ENCOUNTER (OUTPATIENT)
Dept: URBAN - METROPOLITAN AREA CLINIC 92 | Facility: CLINIC | Age: 77
End: 2022-05-11

## 2022-05-11 LAB
ANTIMICROBIAL SUSCEPTIBILITY: (no result)
CAMPYLOBACTER CULTURE: (no result)
E COLI SHIGA TOXIN EIA: NEGATIVE
GIARDIA LAMBLIA AG, EIA: NEGATIVE
Lab: (no result)
OVA + PARASITE EXAM: (no result)
PANCREATIC ELASTASE, FECAL: 182
SALMONELLA/SHIGELLA SCREEN: (no result)
WHITE BLOOD CELLS (WBC), STOOL: (no result)

## 2022-05-11 RX ORDER — OLMESARTAN MEDOXOMIL AND HYDROCHLOROTHIAZIDE 40; 12.5 MG/1; MG/1
(PRIOR AUTH#:000009179771) TABLET ORAL
Qty: 90 DELAYED RELEASE TABLET | Status: ACTIVE | COMMUNITY

## 2022-05-11 RX ORDER — LEVOTHYROXINE SODIUM 0.09 MG/1
(PRIOR AUTH#:000008937753) TABLET ORAL
Qty: 90 DELAYED RELEASE TABLET | Status: ACTIVE | COMMUNITY

## 2022-05-11 RX ORDER — PANCRELIPASE 36000; 180000; 114000 [USP'U]/1; [USP'U]/1; [USP'U]/1
AS DIRECTED CAPSULE, DELAYED RELEASE PELLETS ORAL
Qty: 720 CAPSULES | Refills: 3 | OUTPATIENT
Start: 2022-05-11 | End: 2023-05-06

## 2022-05-11 RX ORDER — AMITRIPTYLINE HYDROCHLORIDE 25 MG/1
1 TABLET AT BEDTIME TABLET, FILM COATED ORAL ONCE A DAY
Qty: 90 TABLET | Refills: 3 | Status: ACTIVE | COMMUNITY
Start: 2021-09-09

## 2022-05-11 RX ORDER — ROSUVASTATIN CALCIUM 5 MG/1
1 TABLET TABLET, FILM COATED ORAL ONCE A DAY
Status: ACTIVE | COMMUNITY

## 2022-05-12 ENCOUNTER — WEB ENCOUNTER (OUTPATIENT)
Dept: URBAN - NONMETROPOLITAN AREA CLINIC 2 | Facility: CLINIC | Age: 77
End: 2022-05-12

## 2022-05-24 ENCOUNTER — WEB ENCOUNTER (OUTPATIENT)
Dept: URBAN - NONMETROPOLITAN AREA CLINIC 2 | Facility: CLINIC | Age: 77
End: 2022-05-24

## 2022-05-26 ENCOUNTER — WEB ENCOUNTER (OUTPATIENT)
Dept: URBAN - NONMETROPOLITAN AREA CLINIC 2 | Facility: CLINIC | Age: 77
End: 2022-05-26

## 2022-05-27 ENCOUNTER — WEB ENCOUNTER (OUTPATIENT)
Dept: URBAN - NONMETROPOLITAN AREA CLINIC 2 | Facility: CLINIC | Age: 77
End: 2022-05-27

## 2022-06-02 ENCOUNTER — WEB ENCOUNTER (OUTPATIENT)
Dept: URBAN - NONMETROPOLITAN AREA CLINIC 2 | Facility: CLINIC | Age: 77
End: 2022-06-02

## 2022-06-03 ENCOUNTER — WEB ENCOUNTER (OUTPATIENT)
Dept: URBAN - NONMETROPOLITAN AREA CLINIC 2 | Facility: CLINIC | Age: 77
End: 2022-06-03

## 2022-06-08 ENCOUNTER — TELEPHONE ENCOUNTER (OUTPATIENT)
Dept: URBAN - METROPOLITAN AREA CLINIC 23 | Facility: CLINIC | Age: 77
End: 2022-06-08

## 2022-06-27 ENCOUNTER — WEB ENCOUNTER (OUTPATIENT)
Dept: URBAN - NONMETROPOLITAN AREA CLINIC 2 | Facility: CLINIC | Age: 77
End: 2022-06-27

## 2022-06-27 RX ORDER — OLMESARTAN MEDOXOMIL AND HYDROCHLOROTHIAZIDE 40; 12.5 MG/1; MG/1
(PRIOR AUTH#:000009179771) TABLET ORAL
Qty: 90 DELAYED RELEASE TABLET | Status: ACTIVE | COMMUNITY

## 2022-06-27 RX ORDER — LEVOTHYROXINE SODIUM 0.09 MG/1
(PRIOR AUTH#:000008937753) TABLET ORAL
Qty: 90 DELAYED RELEASE TABLET | Status: ACTIVE | COMMUNITY

## 2022-06-27 RX ORDER — PANCRELIPASE 36000; 180000; 114000 [USP'U]/1; [USP'U]/1; [USP'U]/1
AS DIRECTED CAPSULE, DELAYED RELEASE PELLETS ORAL
Qty: 720 CAPSULES | Refills: 3 | Status: ACTIVE | COMMUNITY
Start: 2022-05-11 | End: 2023-05-06

## 2022-06-27 RX ORDER — AMITRIPTYLINE HYDROCHLORIDE 25 MG/1
1 TABLET AT BEDTIME TABLET, FILM COATED ORAL ONCE A DAY
Qty: 90 TABLET | Refills: 3 | Status: ACTIVE | COMMUNITY
Start: 2021-09-09

## 2022-06-27 RX ORDER — RIFAXIMIN 550 MG/1
1 TABLET TABLET ORAL THREE TIMES A DAY
Qty: 42 TABLET | Refills: 3 | OUTPATIENT
Start: 2022-06-28 | End: 2022-08-23

## 2022-06-27 RX ORDER — ROSUVASTATIN CALCIUM 5 MG/1
1 TABLET TABLET, FILM COATED ORAL ONCE A DAY
Status: ACTIVE | COMMUNITY

## 2022-06-28 ENCOUNTER — WEB ENCOUNTER (OUTPATIENT)
Dept: URBAN - NONMETROPOLITAN AREA CLINIC 2 | Facility: CLINIC | Age: 77
End: 2022-06-28

## 2022-06-30 ENCOUNTER — TELEPHONE ENCOUNTER (OUTPATIENT)
Dept: URBAN - NONMETROPOLITAN AREA CLINIC 2 | Facility: CLINIC | Age: 77
End: 2022-06-30

## 2022-07-01 ENCOUNTER — TELEPHONE ENCOUNTER (OUTPATIENT)
Dept: URBAN - NONMETROPOLITAN AREA CLINIC 13 | Facility: CLINIC | Age: 77
End: 2022-07-01

## 2022-07-01 RX ORDER — RIFAXIMIN 550 MG/1
1 TABLET TABLET ORAL THREE TIMES A DAY
Qty: 42 TABLET | Refills: 3
Start: 2022-06-28 | End: 2022-08-26

## 2022-08-26 ENCOUNTER — OFFICE VISIT (OUTPATIENT)
Dept: URBAN - NONMETROPOLITAN AREA CLINIC 2 | Facility: CLINIC | Age: 77
End: 2022-08-26
Payer: MEDICARE

## 2022-08-26 VITALS
BODY MASS INDEX: 19.46 KG/M2 | HEIGHT: 64 IN | TEMPERATURE: 97 F | SYSTOLIC BLOOD PRESSURE: 176 MMHG | DIASTOLIC BLOOD PRESSURE: 97 MMHG | WEIGHT: 114 LBS | HEART RATE: 80 BPM

## 2022-08-26 DIAGNOSIS — K58.0 IRRITABLE BOWEL SYNDROME WITH DIARRHEA: ICD-10-CM

## 2022-08-26 DIAGNOSIS — Z12.11 SCREENING FOR COLON CANCER: ICD-10-CM

## 2022-08-26 DIAGNOSIS — K57.90 DIVERTICULOSIS: ICD-10-CM

## 2022-08-26 DIAGNOSIS — R14.0 BLOATING: ICD-10-CM

## 2022-08-26 DIAGNOSIS — R10.9 ABDOMINAL CRAMPING: ICD-10-CM

## 2022-08-26 PROCEDURE — 99214 OFFICE O/P EST MOD 30 MIN: CPT | Performed by: INTERNAL MEDICINE

## 2022-08-26 RX ORDER — OLMESARTAN MEDOXOMIL AND HYDROCHLOROTHIAZIDE 40; 12.5 MG/1; MG/1
(PRIOR AUTH#:000009179771) TABLET ORAL
Qty: 90 DELAYED RELEASE TABLET | Status: ACTIVE | COMMUNITY

## 2022-08-26 RX ORDER — DICYCLOMINE HYDROCHLORIDE 10 MG/1
1 CAPSULE CAPSULE ORAL THREE TIMES A DAY
Qty: 270 CAPSULE | Refills: 6 | OUTPATIENT
Start: 2022-08-26 | End: 2024-05-17

## 2022-08-26 RX ORDER — PANCRELIPASE 36000; 180000; 114000 [USP'U]/1; [USP'U]/1; [USP'U]/1
AS DIRECTED CAPSULE, DELAYED RELEASE PELLETS ORAL
Qty: 720 CAPSULES | Refills: 3 | Status: ACTIVE | COMMUNITY
Start: 2022-05-11 | End: 2023-05-06

## 2022-08-26 RX ORDER — AMITRIPTYLINE HYDROCHLORIDE 25 MG/1
1 TABLET AT BEDTIME TABLET, FILM COATED ORAL ONCE A DAY
Qty: 90 TABLET | Refills: 3 | Status: ACTIVE | COMMUNITY
Start: 2021-09-09

## 2022-08-26 RX ORDER — LEVOTHYROXINE SODIUM 0.09 MG/1
(PRIOR AUTH#:000008937753) TABLET ORAL
Qty: 90 DELAYED RELEASE TABLET | Status: ACTIVE | COMMUNITY

## 2022-08-26 RX ORDER — ROSUVASTATIN CALCIUM 5 MG/1
1 TABLET TABLET, FILM COATED ORAL ONCE A DAY
Status: ACTIVE | COMMUNITY

## 2022-08-26 RX ORDER — OLMESARTAN MEDOXOMIL-HYDROCHLOROTHIAZIDE 12.5; 4 MG/1; MG/1
1 TABLET TABLET, FILM COATED ORAL ONCE A DAY
Status: ACTIVE | COMMUNITY

## 2022-08-26 RX ORDER — LEVOTHYROXINE SODIUM 50 UG/1
1 TABLET IN THE MORNING ON AN EMPTY STOMACH TABLET ORAL ONCE A DAY
Status: ACTIVE | COMMUNITY

## 2022-08-26 NOTE — PHYSICAL EXAM SKIN:
no rashes , no suspicious lesions
POST-OP DIAGNOSIS:  Renal calculus, left 03-Nov-2021 08:24:29  Clemente Bonilla

## 2022-08-26 NOTE — HPI-TODAY'S VISIT:
3-16-21 The patient is a 75-year-old female who presents today for problems with IBS and diarrhea.  She was previously being seen by Dr. Ramses Sotelo.  He did a colonoscopy on her several years ago, but biopsies were not performed at that time.  She had been maintained on Viberzi, but she felt that this was causing too much constipation.  She actually became so constipated, that she had to go to the emergency room.  She stopped the Viberzi, and she was told to take it as needed.  This was not helping with her symptoms.  She is now having several hard bowel movements followed by urgent diarrhea.  She denies any blood in her stool.  She denies any weight loss.  She has never been on a specific FODMAP diet, but she has tried to eliminate some gluten and dairy.  She feels like she tries to eat fairly healthy.  She has never been tried on dicyclomine.  She has been on amitriptyline, and this did cause her some dizziness.  She also complains of bloating and gas today.  She is not taking any fiber on a regular basis.  She does take a probiotic occasionally.  She feels that IBgard helps with her symptoms more than any of the other medications that she has tried.  Overall, she is here today for second opinion.  She has had problems with IBS and diarrhea her entire life.  She was referred to [place] by Dr. Sotelo, but this never happened due to the pandemic.  We have discussed various treatment options today in detail.  She is willing to try a trial of Xifaxan.  It does not appear that she was ever ruled out for microscopic colitis, so we have discussed this today as well. 5-11-21 The patient presents today for follow-up of her IBS and diarrhea.  Since our last visit, she did take Xifaxan.  Her diarrhea has improved.  Now her stools have changed to where she is having several hard balls throughout the day.  She has multiple bowel movements throughout the day, and she never feels that she has a complete bowel movement.  She is taking 1 Metamucil tablet in the morning, but she forgets to take her Metamucil at night.  We have discussed adding MiraLAX in the morning along with increasing her Metamucil at night.  She is in agreement with this plan today.  Overall, she is feeling much better.  We have also discussed diet, but she is meeting with her primary care physician.  Her cholesterol has been elevated.  9/9/2021 Mrs. Truong presents for follow-up with IBS.  Since her last visit she started Metamucil twice daily and MiraLAX daily.  She subsequently developed urgent diffuse diarrhea with up to 10 bowel movements daily earlier this week.  She stopped the MiraLAX and took Imodium and did not have a bowel movement today.  She is taken Xifaxan twice and did not feel that it helped significantly.  She has been food journaling with no identifiable trigger to her symptoms.  Today we have had a long discussion regarding her symptoms.  Her IBS truly seems mixed.  She is taking her fiber daily.  She has never tried amitriptyline in the past.  She agrees to pursue colonoscopy if no relief.  MB 3/15/2022 Marisel presents for follow-up of irritable bowel syndrome with diarrhea predominance.  Since her last visit she started the amitriptyline 10 mg at night and we actually increased to 25 mg nightly.  She continues to have multiple urgent bowel movements usually in the morning.  She has failed Xifaxan, dicyclomine, Levsin in the past.  She is on Florastor daily.  Today we discussed her differential, I am concerned about microscopic colitis.  She agrees to repeat blood work and colonoscopy with random biopsies.  MB 4/29/2022 Mrs. Truong presents for follow-up of bloating, abdominal pain, and alternating constipation and diarrhea.  Since her last visit her repeat colonoscopy is normal with left-sided diverticular disease and normal random biopsies.  She is on amitriptyline 25 mg at night.  She states at times her stools are hard, she would then have these episodes of abdominal cramping with urgency and diarrhea.  Imodium does help but this seems to wipe her out for the day.  She is failed multiple medications in the past, she states that she cannot tolerate psyllium.  Viberzi helped in the distant past with Dr. Sotelo but this ultimately caused constipation and abdominal pain.  She does still have her gallbladder.  Her last EGD was done by Dr. Sotelo several years ago.  She has increased bloating as well.  She is on pantoprazole 40 mg daily.  She also takes olmesartan.  Today we have discussed her differential.  I suspect she still has a component of IBS mixed particularly since the amitriptyline 25 mg at night is the only thing that is ever helped her abdominal pain.  Her labs are normal, her colonoscopy is normal.  She declines repeat upper endoscopy for today.  She does agree to an ultrasound of her gallbladder.  She is concerned about EPI, we have discussed checking her stool studies for parasites and fecal elastase.  If her work-up is normal she would like to repeat a course of Xifaxan however she cannot tolerate 14 days, we did discuss a trial of a 3-day course.  Today she is doing about the same.  She continues to climb plaint of abdominal pain cramping and bowel regularity.  MB 8/26/2022 The patient presents today for follow-up of her IBS with mixed constipation and diarrhea.  Since her last visit, she did have stool studies that were positive for Salmonella.  She never did take any antibiotics.  Ultimately, she did take 10 days of Xifaxan.  While she is on the Xifaxan, she usually feels better, and this will last for several weeks, but then her symptoms recur.  She continues to have alternating diarrhea and constipation along with severe bloating.  She is extremely frustrated with her symptoms.  She has tried multiple medications.  Also, since our last visit, she has stopped her amitriptyline 25 mg at night.  She states she did never feel that this helped her significantly.  She is taking low-dose Metamucil.  Today, we have discussed increasing her Metamucil to 2 teaspoons.  We are going to retry dicyclomine.  Her main complaint, is that she goes to sleep okay, but she wakes up in the morning and has severe cramping with urgent diarrhea.  This does not happen every day, but it does happen a significant amount of the time.  We have discussed taking dicyclomine every morning, but decreasing this if she gets constipated.  She can also take another dicyclomine if she continues to have urgency.  We have also discussed that amitriptyline is a good medication for this, and I am concerned that her discontinuing this may have made her symptoms worse.  She also took Viberzi for some period of time.  This helped for a while, but then she felt that this caused her to become constipated.  She feels that some medicines cause her diarrhea, and some medicines cause her constipation.  I discussed that this may actually be her disease process, and not these medications.  We will see her back in the office in 3 or 4 months for further evaluation.

## 2022-08-31 ENCOUNTER — TELEPHONE ENCOUNTER (OUTPATIENT)
Dept: URBAN - NONMETROPOLITAN AREA CLINIC 2 | Facility: CLINIC | Age: 77
End: 2022-08-31

## 2022-09-02 LAB
CAMPYLOBACTER SPP. AG,EIA: (no result)
SALMONELLA AND SHIGELLA, CULTURE: (no result)
SHIGA TOXINS, EIA W/RFL TO E.COLI O157 CULTURE: (no result)

## 2022-09-06 ENCOUNTER — TELEPHONE ENCOUNTER (OUTPATIENT)
Dept: URBAN - METROPOLITAN AREA CLINIC 92 | Facility: CLINIC | Age: 77
End: 2022-09-06

## 2022-09-06 ENCOUNTER — ERX REFILL RESPONSE (OUTPATIENT)
Dept: URBAN - NONMETROPOLITAN AREA CLINIC 2 | Facility: CLINIC | Age: 77
End: 2022-09-06

## 2022-10-21 ENCOUNTER — OFFICE VISIT (OUTPATIENT)
Dept: URBAN - NONMETROPOLITAN AREA CLINIC 2 | Facility: CLINIC | Age: 77
End: 2022-10-21

## 2022-12-09 ENCOUNTER — OFFICE VISIT (OUTPATIENT)
Dept: URBAN - NONMETROPOLITAN AREA CLINIC 2 | Facility: CLINIC | Age: 77
End: 2022-12-09
Payer: MEDICARE

## 2022-12-09 VITALS
HEIGHT: 64 IN | SYSTOLIC BLOOD PRESSURE: 134 MMHG | TEMPERATURE: 97 F | WEIGHT: 115 LBS | DIASTOLIC BLOOD PRESSURE: 85 MMHG | HEART RATE: 62 BPM | BODY MASS INDEX: 19.63 KG/M2

## 2022-12-09 DIAGNOSIS — R14.0 BLOATING: ICD-10-CM

## 2022-12-09 DIAGNOSIS — Z12.11 SCREENING FOR COLON CANCER: ICD-10-CM

## 2022-12-09 DIAGNOSIS — K58.0 IRRITABLE BOWEL SYNDROME WITH DIARRHEA: ICD-10-CM

## 2022-12-09 DIAGNOSIS — K64.9 HEMORRHOIDS, UNSPECIFIED HEMORRHOID TYPE: ICD-10-CM

## 2022-12-09 DIAGNOSIS — R10.9 ABDOMINAL CRAMPING: ICD-10-CM

## 2022-12-09 DIAGNOSIS — K57.90 DIVERTICULOSIS: ICD-10-CM

## 2022-12-09 DIAGNOSIS — K21.9 GASTROESOPHAGEAL REFLUX DISEASE, UNSPECIFIED WHETHER ESOPHAGITIS PRESENT: ICD-10-CM

## 2022-12-09 PROBLEM — 305058001: Status: ACTIVE | Noted: 2021-03-16

## 2022-12-09 PROCEDURE — 99214 OFFICE O/P EST MOD 30 MIN: CPT | Performed by: INTERNAL MEDICINE

## 2022-12-09 RX ORDER — LEVOTHYROXINE SODIUM 50 UG/1
1 TABLET IN THE MORNING ON AN EMPTY STOMACH TABLET ORAL ONCE A DAY
Status: ACTIVE | COMMUNITY

## 2022-12-09 RX ORDER — HYDROCORTISONE 25 MG/G
1 APPLICATION CREAM TOPICAL TWICE A DAY
Qty: 1 APPLICATOR | Refills: 3 | OUTPATIENT
Start: 2022-12-09 | End: 2023-12-04

## 2022-12-09 RX ORDER — LEVOTHYROXINE SODIUM 0.09 MG/1
(PRIOR AUTH#:000008937753) TABLET ORAL
Qty: 90 DELAYED RELEASE TABLET | Status: ACTIVE | COMMUNITY

## 2022-12-09 RX ORDER — PANCRELIPASE 36000; 180000; 114000 [USP'U]/1; [USP'U]/1; [USP'U]/1
AS DIRECTED CAPSULE, DELAYED RELEASE PELLETS ORAL
Qty: 720 CAPSULES | Refills: 3 | Status: ACTIVE | COMMUNITY
Start: 2022-05-11 | End: 2023-05-06

## 2022-12-09 RX ORDER — OLMESARTAN MEDOXOMIL AND HYDROCHLOROTHIAZIDE 40; 12.5 MG/1; MG/1
(PRIOR AUTH#:000009179771) TABLET ORAL
Qty: 90 DELAYED RELEASE TABLET | Status: ACTIVE | COMMUNITY

## 2022-12-09 RX ORDER — AMITRIPTYLINE HYDROCHLORIDE 25 MG/1
1 TABLET AT BEDTIME TABLET, FILM COATED ORAL ONCE A DAY
Qty: 90 TABLET | Refills: 3 | Status: ON HOLD | COMMUNITY
Start: 2021-09-09

## 2022-12-09 RX ORDER — FAMOTIDINE 20 MG/1
TAKE 1 TABLET (20 MG) BY ORAL ROUTE 2 TIMES PER DAY FOR 30 DAYS TABLET ORAL TWICE A DAY
Qty: 180 TABLET | Refills: 3 | OUTPATIENT
Start: 2022-12-09

## 2022-12-09 RX ORDER — ROSUVASTATIN CALCIUM 5 MG/1
1 TABLET TABLET, FILM COATED ORAL ONCE A DAY
Status: ACTIVE | COMMUNITY

## 2022-12-09 RX ORDER — DICYCLOMINE HYDROCHLORIDE 10 MG/1
1 CAPSULE CAPSULE ORAL THREE TIMES A DAY
Qty: 270 CAPSULE | Refills: 6 | Status: ACTIVE | COMMUNITY
Start: 2022-08-26 | End: 2024-05-17

## 2022-12-09 RX ORDER — OLMESARTAN MEDOXOMIL-HYDROCHLOROTHIAZIDE 12.5; 4 MG/1; MG/1
1 TABLET TABLET, FILM COATED ORAL ONCE A DAY
Status: ACTIVE | COMMUNITY

## 2022-12-09 NOTE — PHYSICAL EXAM GASTROINTESTINAL
Abdomen , soft, nontender, nondistended , normal bowel sounds Patient notified of her blood test results and the need to have them repeated in October per Dr. Dawn's request. She will come to lab middle October for repeat and I told her we would call her with the results once they are done. Stephanie Cailxto RN

## 2022-12-09 NOTE — HPI-TODAY'S VISIT:
3-16-21 The patient is a 75-year-old female who presents today for problems with IBS and diarrhea.  She was previously being seen by Dr. Ramses Sotelo.  He did a colonoscopy on her several years ago, but biopsies were not performed at that time.  She had been maintained on Viberzi, but she felt that this was causing too much constipation.  She actually became so constipated, that she had to go to the emergency room.  She stopped the Viberzi, and she was told to take it as needed.  This was not helping with her symptoms.  She is now having several hard bowel movements followed by urgent diarrhea.  She denies any blood in her stool.  She denies any weight loss.  She has never been on a specific FODMAP diet, but she has tried to eliminate some gluten and dairy.  She feels like she tries to eat fairly healthy.  She has never been tried on dicyclomine.  She has been on amitriptyline, and this did cause her some dizziness.  She also complains of bloating and gas today.  She is not taking any fiber on a regular basis.  She does take a probiotic occasionally.  She feels that IBgard helps with her symptoms more than any of the other medications that she has tried.  Overall, she is here today for second opinion.  She has had problems with IBS and diarrhea her entire life.  She was referred to [place] by Dr. Sotelo, but this never happened due to the pandemic.  We have discussed various treatment options today in detail.  She is willing to try a trial of Xifaxan.  It does not appear that she was ever ruled out for microscopic colitis, so we have discussed this today as well. 5-11-21 The patient presents today for follow-up of her IBS and diarrhea.  Since our last visit, she did take Xifaxan.  Her diarrhea has improved.  Now her stools have changed to where she is having several hard balls throughout the day.  She has multiple bowel movements throughout the day, and she never feels that she has a complete bowel movement.  She is taking 1 Metamucil tablet in the morning, but she forgets to take her Metamucil at night.  We have discussed adding MiraLAX in the morning along with increasing her Metamucil at night.  She is in agreement with this plan today.  Overall, she is feeling much better.  We have also discussed diet, but she is meeting with her primary care physician.  Her cholesterol has been elevated.  9/9/2021 Mrs. Truong presents for follow-up with IBS.  Since her last visit she started Metamucil twice daily and MiraLAX daily.  She subsequently developed urgent diffuse diarrhea with up to 10 bowel movements daily earlier this week.  She stopped the MiraLAX and took Imodium and did not have a bowel movement today.  She is taken Xifaxan twice and did not feel that it helped significantly.  She has been food journaling with no identifiable trigger to her symptoms.  Today we have had a long discussion regarding her symptoms.  Her IBS truly seems mixed.  She is taking her fiber daily.  She has never tried amitriptyline in the past.  She agrees to pursue colonoscopy if no relief.  MB 3/15/2022 Marisel presents for follow-up of irritable bowel syndrome with diarrhea predominance.  Since her last visit she started the amitriptyline 10 mg at night and we actually increased to 25 mg nightly.  She continues to have multiple urgent bowel movements usually in the morning.  She has failed Xifaxan, dicyclomine, Levsin in the past.  She is on Florastor daily.  Today we discussed her differential, I am concerned about microscopic colitis.  She agrees to repeat blood work and colonoscopy with random biopsies.  MB 4/29/2022 Mrs. Truong presents for follow-up of bloating, abdominal pain, and alternating constipation and diarrhea.  Since her last visit her repeat colonoscopy is normal with left-sided diverticular disease and normal random biopsies.  She is on amitriptyline 25 mg at night.  She states at times her stools are hard, she would then have these episodes of abdominal cramping with urgency and diarrhea.  Imodium does help but this seems to wipe her out for the day.  She is failed multiple medications in the past, she states that she cannot tolerate psyllium.  Viberzi helped in the distant past with Dr. Sotelo but this ultimately caused constipation and abdominal pain.  She does still have her gallbladder.  Her last EGD was done by Dr. Sotelo several years ago.  She has increased bloating as well.  She is on pantoprazole 40 mg daily.  She also takes olmesartan.  Today we have discussed her differential.  I suspect she still has a component of IBS mixed particularly since the amitriptyline 25 mg at night is the only thing that is ever helped her abdominal pain.  Her labs are normal, her colonoscopy is normal.  She declines repeat upper endoscopy for today.  She does agree to an ultrasound of her gallbladder.  She is concerned about EPI, we have discussed checking her stool studies for parasites and fecal elastase.  If her work-up is normal she would like to repeat a course of Xifaxan however she cannot tolerate 14 days, we did discuss a trial of a 3-day course.  Today she is doing about the same.  She continues to climb plaint of abdominal pain cramping and bowel regularity.  MB 8/26/2022 The patient presents today for follow-up of her IBS with mixed constipation and diarrhea.  Since her last visit, she did have stool studies that were positive for Salmonella.  She never did take any antibiotics.  Ultimately, she did take 10 days of Xifaxan.  While she is on the Xifaxan, she usually feels better, and this will last for several weeks, but then her symptoms recur.  She continues to have alternating diarrhea and constipation along with severe bloating.  She is extremely frustrated with her symptoms.  She has tried multiple medications.  Also, since our last visit, she has stopped her amitriptyline 25 mg at night.  She states she did never feel that this helped her significantly.  She is taking low-dose Metamucil.  Today, we have discussed increasing her Metamucil to 2 teaspoons.  We are going to retry dicyclomine.  Her main complaint, is that she goes to sleep okay, but she wakes up in the morning and has severe cramping with urgent diarrhea.  This does not happen every day, but it does happen a significant amount of the time.  We have discussed taking dicyclomine every morning, but decreasing this if she gets constipated.  She can also take another dicyclomine if she continues to have urgency.  We have also discussed that amitriptyline is a good medication for this, and I am concerned that her discontinuing this may have made her symptoms worse.  She also took Viberzi for some period of time.  This helped for a while, but then she felt that this caused her to become constipated.  She feels that some medicines cause her diarrhea, and some medicines cause her constipation.  I discussed that this may actually be her disease process, and not these medications.  We will see her back in the office in 3 or 4 months for further evaluation. 12/9/2022 The patient presents today for follow-up of her IBS with mixed constipation and diarrhea.  More recently, she has been having stools that have been harder.  She did have 1 episode of diarrhea, but this is now resolved.  She is taking low-dose Metamucil, but she is not taking this every day.  Today, we have discussed taking MiraLAX in the morning and Metamucil at night to control her symptoms somewhat better.  She is willing to give this a try.  If she does have worsening of her diarrhea, then I do want her to use the dicyclomine more regularly.  Her reflux has been well controlled with Protonix 40 mg in the morning.  She does occasionally have breakthrough with her reflux in the evening.  Today, we have discussed taking famotidine 20 mg at night as needed.  She is also had problems with her hemorrhoids.  She occasionally has bleeding and pain.  The Proctofoam has helped in the past, and we are going to call this in again today.  We will see her back in the office in 6 months for further evaluation.

## 2022-12-13 ENCOUNTER — TELEPHONE ENCOUNTER (OUTPATIENT)
Dept: URBAN - NONMETROPOLITAN AREA CLINIC 2 | Facility: CLINIC | Age: 77
End: 2022-12-13

## 2023-01-27 ENCOUNTER — P2P PATIENT RECORD (OUTPATIENT)
Age: 78
End: 2023-01-27

## 2023-04-13 ENCOUNTER — TELEPHONE ENCOUNTER (OUTPATIENT)
Dept: URBAN - NONMETROPOLITAN AREA CLINIC 2 | Facility: CLINIC | Age: 78
End: 2023-04-13

## 2023-05-09 ENCOUNTER — OFFICE VISIT (OUTPATIENT)
Dept: URBAN - NONMETROPOLITAN AREA CLINIC 2 | Facility: CLINIC | Age: 78
End: 2023-05-09
Payer: MEDICARE

## 2023-05-09 VITALS
WEIGHT: 114 LBS | DIASTOLIC BLOOD PRESSURE: 71 MMHG | BODY MASS INDEX: 19.46 KG/M2 | TEMPERATURE: 98.6 F | HEIGHT: 64 IN | SYSTOLIC BLOOD PRESSURE: 122 MMHG | HEART RATE: 72 BPM

## 2023-05-09 DIAGNOSIS — K21.9 GASTROESOPHAGEAL REFLUX DISEASE, UNSPECIFIED WHETHER ESOPHAGITIS PRESENT: ICD-10-CM

## 2023-05-09 DIAGNOSIS — K58.2 IRRITABLE BOWEL SYNDROME WITH BOTH CONSTIPATION AND DIARRHEA: ICD-10-CM

## 2023-05-09 PROBLEM — 10743008: Status: ACTIVE | Noted: 2023-05-09

## 2023-05-09 PROBLEM — 247330004: Status: ACTIVE | Noted: 2022-04-29

## 2023-05-09 PROBLEM — 397881000: Status: ACTIVE | Noted: 2021-03-16

## 2023-05-09 PROBLEM — 70153002: Status: ACTIVE | Noted: 2023-05-09

## 2023-05-09 PROBLEM — 235595009: Status: ACTIVE | Noted: 2022-12-09

## 2023-05-09 PROBLEM — 116289008: Status: ACTIVE | Noted: 2021-03-16

## 2023-05-09 PROCEDURE — 99214 OFFICE O/P EST MOD 30 MIN: CPT | Performed by: NURSE PRACTITIONER

## 2023-05-09 RX ORDER — ROSUVASTATIN CALCIUM 5 MG/1
1 TABLET TABLET, FILM COATED ORAL ONCE A DAY
Status: ACTIVE | COMMUNITY

## 2023-05-09 RX ORDER — LEVOTHYROXINE SODIUM 50 UG/1
1 TABLET IN THE MORNING ON AN EMPTY STOMACH TABLET ORAL ONCE A DAY
Status: ACTIVE | COMMUNITY

## 2023-05-09 RX ORDER — MAGNESIUM 200 MG
2 TABLETS WITH A MEAL TABLET ORAL
Status: ACTIVE | COMMUNITY

## 2023-05-09 RX ORDER — HYDROCORTISONE 25 MG/G
1 APPLICATION CREAM TOPICAL TWICE A DAY
Qty: 1 APPLICATOR | Refills: 3 | Status: ACTIVE | COMMUNITY
Start: 2022-12-09 | End: 2023-12-04

## 2023-05-09 RX ORDER — LEVOTHYROXINE SODIUM 0.09 MG/1
(PRIOR AUTH#:000008937753) TABLET ORAL
Qty: 90 DELAYED RELEASE TABLET | Status: ACTIVE | COMMUNITY

## 2023-05-09 RX ORDER — OLMESARTAN MEDOXOMIL AND HYDROCHLOROTHIAZIDE 40; 12.5 MG/1; MG/1
(PRIOR AUTH#:000009179771) TABLET ORAL
Qty: 90 DELAYED RELEASE TABLET | Status: ACTIVE | COMMUNITY

## 2023-05-09 RX ORDER — OLMESARTAN MEDOXOMIL-HYDROCHLOROTHIAZIDE 12.5; 4 MG/1; MG/1
1 TABLET TABLET, FILM COATED ORAL ONCE A DAY
Status: ACTIVE | COMMUNITY

## 2023-05-09 RX ORDER — FAMOTIDINE 20 MG/1
TAKE 1 TABLET (20 MG) BY ORAL ROUTE 2 TIMES PER DAY FOR 30 DAYS TABLET ORAL TWICE A DAY
Qty: 180 TABLET | Refills: 3 | Status: ACTIVE | COMMUNITY
Start: 2022-12-09

## 2023-05-09 RX ORDER — DICYCLOMINE HYDROCHLORIDE 10 MG/1
1 CAPSULE CAPSULE ORAL THREE TIMES A DAY
Qty: 270 CAPSULE | Refills: 6 | Status: ACTIVE | COMMUNITY
Start: 2022-08-26 | End: 2024-05-17

## 2023-05-09 RX ORDER — DICYCLOMINE HYDROCHLORIDE 10 MG/1
1 CAPSULE CAPSULE ORAL
Qty: 270 CAPSULES | Refills: 3
Start: 2022-08-26 | End: 2024-05-03

## 2023-05-09 RX ORDER — AMITRIPTYLINE HYDROCHLORIDE 25 MG/1
1 TABLET AT BEDTIME TABLET, FILM COATED ORAL ONCE A DAY
Qty: 90 TABLET | Refills: 3 | Status: ON HOLD | COMMUNITY
Start: 2021-09-09

## 2023-05-09 NOTE — HPI-TODAY'S VISIT:
3-16-21 The patient is a 75-year-old female who presents today for problems with IBS and diarrhea.  She was previously being seen by Dr. Ramses Sotelo.  He did a colonoscopy on her several years ago, but biopsies were not performed at that time.  She had been maintained on Viberzi, but she felt that this was causing too much constipation.  She actually became so constipated, that she had to go to the emergency room.  She stopped the Viberzi, and she was told to take it as needed.  This was not helping with her symptoms.  She is now having several hard bowel movements followed by urgent diarrhea.  She denies any blood in her stool.  She denies any weight loss.  She has never been on a specific FODMAP diet, but she has tried to eliminate some gluten and dairy.  She feels like she tries to eat fairly healthy.  She has never been tried on dicyclomine.  She has been on amitriptyline, and this did cause her some dizziness.  She also complains of bloating and gas today.  She is not taking any fiber on a regular basis.  She does take a probiotic occasionally.  She feels that IBgard helps with her symptoms more than any of the other medications that she has tried.  Overall, she is here today for second opinion.  She has had problems with IBS and diarrhea her entire life.  She was referred to [place] by Dr. Sotelo, but this never happened due to the pandemic.  We have discussed various treatment options today in detail.  She is willing to try a trial of Xifaxan.  It does not appear that she was ever ruled out for microscopic colitis, so we have discussed this today as well. 5-11-21 The patient presents today for follow-up of her IBS and diarrhea.  Since our last visit, she did take Xifaxan.  Her diarrhea has improved.  Now her stools have changed to where she is having several hard balls throughout the day.  She has multiple bowel movements throughout the day, and she never feels that she has a complete bowel movement.  She is taking 1 Metamucil tablet in the morning, but she forgets to take her Metamucil at night.  We have discussed adding MiraLAX in the morning along with increasing her Metamucil at night.  She is in agreement with this plan today.  Overall, she is feeling much better.  We have also discussed diet, but she is meeting with her primary care physician.  Her cholesterol has been elevated.  9/9/2021 Mrs. Truong presents for follow-up with IBS.  Since her last visit she started Metamucil twice daily and MiraLAX daily.  She subsequently developed urgent diffuse diarrhea with up to 10 bowel movements daily earlier this week.  She stopped the MiraLAX and took Imodium and did not have a bowel movement today.  She is taken Xifaxan twice and did not feel that it helped significantly.  She has been food journaling with no identifiable trigger to her symptoms.  Today we have had a long discussion regarding her symptoms.  Her IBS truly seems mixed.  She is taking her fiber daily.  She has never tried amitriptyline in the past.  She agrees to pursue colonoscopy if no relief.  MB 3/15/2022 Marisel presents for follow-up of irritable bowel syndrome with diarrhea predominance.  Since her last visit she started the amitriptyline 10 mg at night and we actually increased to 25 mg nightly.  She continues to have multiple urgent bowel movements usually in the morning.  She has failed Xifaxan, dicyclomine, Levsin in the past.  She is on Florastor daily.  Today we discussed her differential, I am concerned about microscopic colitis.  She agrees to repeat blood work and colonoscopy with random biopsies.  MB 4/29/2022 Mrs. Truong presents for follow-up of bloating, abdominal pain, and alternating constipation and diarrhea.  Since her last visit her repeat colonoscopy is normal with left-sided diverticular disease and normal random biopsies.  She is on amitriptyline 25 mg at night.  She states at times her stools are hard, she would then have these episodes of abdominal cramping with urgency and diarrhea.  Imodium does help but this seems to wipe her out for the day.  She is failed multiple medications in the past, she states that she cannot tolerate psyllium.  Viberzi helped in the distant past with Dr. Sotelo but this ultimately caused constipation and abdominal pain.  She does still have her gallbladder.  Her last EGD was done by Dr. Sotelo several years ago.  She has increased bloating as well.  She is on pantoprazole 40 mg daily.  She also takes olmesartan.  Today we have discussed her differential.  I suspect she still has a component of IBS mixed particularly since the amitriptyline 25 mg at night is the only thing that is ever helped her abdominal pain.  Her labs are normal, her colonoscopy is normal.  She declines repeat upper endoscopy for today.  She does agree to an ultrasound of her gallbladder.  She is concerned about EPI, we have discussed checking her stool studies for parasites and fecal elastase.  If her work-up is normal she would like to repeat a course of Xifaxan however she cannot tolerate 14 days, we did discuss a trial of a 3-day course.  Today she is doing about the same.  She continues to climb plaint of abdominal pain cramping and bowel regularity.  MB 8/26/2022 The patient presents today for follow-up of her IBS with mixed constipation and diarrhea.  Since her last visit, she did have stool studies that were positive for Salmonella.  She never did take any antibiotics.  Ultimately, she did take 10 days of Xifaxan.  While she is on the Xifaxan, she usually feels better, and this will last for several weeks, but then her symptoms recur.  She continues to have alternating diarrhea and constipation along with severe bloating.  She is extremely frustrated with her symptoms.  She has tried multiple medications.  Also, since our last visit, she has stopped her amitriptyline 25 mg at night.  She states she did never feel that this helped her significantly.  She is taking low-dose Metamucil.  Today, we have discussed increasing her Metamucil to 2 teaspoons.  We are going to retry dicyclomine.  Her main complaint, is that she goes to sleep okay, but she wakes up in the morning and has severe cramping with urgent diarrhea.  This does not happen every day, but it does happen a significant amount of the time.  We have discussed taking dicyclomine every morning, but decreasing this if she gets constipated.  She can also take another dicyclomine if she continues to have urgency.  We have also discussed that amitriptyline is a good medication for this, and I am concerned that her discontinuing this may have made her symptoms worse.  She also took Viberzi for some period of time.  This helped for a while, but then she felt that this caused her to become constipated.  She feels that some medicines cause her diarrhea, and some medicines cause her constipation.  I discussed that this may actually be her disease process, and not these medications.  We will see her back in the office in 3 or 4 months for further evaluation. 12/9/2022 The patient presents today for follow-up of her IBS with mixed constipation and diarrhea.  More recently, she has been having stools that have been harder.  She did have 1 episode of diarrhea, but this is now resolved.  She is taking low-dose Metamucil, but she is not taking this every day.  Today, we have discussed taking MiraLAX in the morning and Metamucil at night to control her symptoms somewhat better.  She is willing to give this a try.  If she does have worsening of her diarrhea, then I do want her to use the dicyclomine more regularly.  Her reflux has been well controlled with Protonix 40 mg in the morning.  She does occasionally have breakthrough with her reflux in the evening.  Today, we have discussed taking famotidine 20 mg at night as needed.  She is also had problems with her hemorrhoids.  She occasionally has bleeding and pain.  The Proctofoam has helped in the past, and we are going to call this in again today.  We will see her back in the office in 6 months for further evaluation. 5/9/2023 Marisel presents for follow-up of reflux and IBS.  She continues to struggle with her IBS symptoms.  She is not taking the MiraLAX as discussed previously.  She is taking 2-3 fiber tablets.  We have discussed ensuring these are psyllium fiber, I prefer the capsules.  I want her to restart the MiraLAX but try fourth or half capful daily.  She may use dicyclomine as needed.  She is on pantoprazole 20 mg daily with no significant breakthrough reflux.  She does agree to wean to Pepcid 20 mg daily then as needed.  Today she is doing about the same.  She denies any new GI complaints.  MB

## 2023-10-02 ENCOUNTER — WEB ENCOUNTER (OUTPATIENT)
Dept: URBAN - NONMETROPOLITAN AREA CLINIC 2 | Facility: CLINIC | Age: 78
End: 2023-10-02

## 2023-10-05 ENCOUNTER — WEB ENCOUNTER (OUTPATIENT)
Dept: URBAN - NONMETROPOLITAN AREA CLINIC 2 | Facility: CLINIC | Age: 78
End: 2023-10-05

## 2024-03-14 ENCOUNTER — LAB (OUTPATIENT)
Dept: URBAN - NONMETROPOLITAN AREA CLINIC 2 | Facility: CLINIC | Age: 79
End: 2024-03-14

## 2024-03-14 ENCOUNTER — OV EP (OUTPATIENT)
Dept: URBAN - NONMETROPOLITAN AREA CLINIC 2 | Facility: CLINIC | Age: 79
End: 2024-03-14
Payer: MEDICARE

## 2024-03-14 VITALS
WEIGHT: 116 LBS | HEART RATE: 68 BPM | BODY MASS INDEX: 19.81 KG/M2 | TEMPERATURE: 98 F | SYSTOLIC BLOOD PRESSURE: 120 MMHG | HEIGHT: 64 IN | DIASTOLIC BLOOD PRESSURE: 74 MMHG

## 2024-03-14 DIAGNOSIS — R14.0 BLOATING: ICD-10-CM

## 2024-03-14 DIAGNOSIS — K58.2 IRRITABLE BOWEL SYNDROME WITH BOTH CONSTIPATION AND DIARRHEA: ICD-10-CM

## 2024-03-14 DIAGNOSIS — K21.9 ACID REFLUX: ICD-10-CM

## 2024-03-14 DIAGNOSIS — K57.90 DIVERTICULOSIS: ICD-10-CM

## 2024-03-14 PROCEDURE — 99214 OFFICE O/P EST MOD 30 MIN: CPT | Performed by: NURSE PRACTITIONER

## 2024-03-14 RX ORDER — FAMOTIDINE 20 MG/1
TAKE 1 TABLET (20 MG) BY ORAL ROUTE 2 TIMES PER DAY FOR 30 DAYS TABLET ORAL TWICE A DAY
Qty: 180 TABLET | Refills: 3 | Status: ACTIVE | COMMUNITY
Start: 2022-12-09

## 2024-03-14 RX ORDER — ROSUVASTATIN CALCIUM 5 MG/1
1 TABLET TABLET, FILM COATED ORAL ONCE A DAY
Status: ACTIVE | COMMUNITY

## 2024-03-14 RX ORDER — FAMOTIDINE 40 MG/1
1 TABLET AT BEDTIME TABLET, FILM COATED ORAL ONCE A DAY
Qty: 30 | OUTPATIENT
Start: 2024-03-14

## 2024-03-14 RX ORDER — LEVOTHYROXINE SODIUM 50 UG/1
1 TABLET IN THE MORNING ON AN EMPTY STOMACH TABLET ORAL ONCE A DAY
Status: ACTIVE | COMMUNITY

## 2024-03-14 RX ORDER — OLMESARTAN MEDOXOMIL AND HYDROCHLOROTHIAZIDE 40; 12.5 MG/1; MG/1
(PRIOR AUTH#:000009179771) TABLET ORAL
Qty: 90 DELAYED RELEASE TABLET | Status: ACTIVE | COMMUNITY

## 2024-03-14 RX ORDER — LEVOTHYROXINE SODIUM 0.09 MG/1
(PRIOR AUTH#:000008937753) TABLET ORAL
Qty: 90 DELAYED RELEASE TABLET | Status: ACTIVE | COMMUNITY

## 2024-03-14 RX ORDER — MAGNESIUM 200 MG
2 TABLETS WITH A MEAL TABLET ORAL
Status: ACTIVE | COMMUNITY

## 2024-03-14 RX ORDER — OLMESARTAN MEDOXOMIL-HYDROCHLOROTHIAZIDE 12.5; 4 MG/1; MG/1
1 TABLET TABLET, FILM COATED ORAL ONCE A DAY
Status: ACTIVE | COMMUNITY

## 2024-03-14 RX ORDER — DICYCLOMINE HYDROCHLORIDE 10 MG/1
1 CAPSULE CAPSULE ORAL
Qty: 270 CAPSULES | Refills: 3 | Status: ACTIVE | COMMUNITY
Start: 2022-08-26 | End: 2024-05-03

## 2024-03-14 RX ORDER — ESOMEPRAZOLE MAGNESIUM 40 MG/1
1 CAPSULE CAPSULE, DELAYED RELEASE ORAL ONCE A DAY
Qty: 30 | OUTPATIENT
Start: 2024-03-14

## 2024-03-14 NOTE — HPI-TODAY'S VISIT:
3-16-21 The patient is a 75-year-old female who presents today for problems with IBS and diarrhea.  She was previously being seen by Dr. Ramses Sotelo.  He did a colonoscopy on her several years ago, but biopsies were not performed at that time.  She had been maintained on Viberzi, but she felt that this was causing too much constipation.  She actually became so constipated, that she had to go to the emergency room.  She stopped the Viberzi, and she was told to take it as needed.  This was not helping with her symptoms.  She is now having several hard bowel movements followed by urgent diarrhea.  She denies any blood in her stool.  She denies any weight loss.  She has never been on a specific FODMAP diet, but she has tried to eliminate some gluten and dairy.  She feels like she tries to eat fairly healthy.  She has never been tried on dicyclomine.  She has been on amitriptyline, and this did cause her some dizziness.  She also complains of bloating and gas today.  She is not taking any fiber on a regular basis.  She does take a probiotic occasionally.  She feels that IBgard helps with her symptoms more than any of the other medications that she has tried.  Overall, she is here today for second opinion.  She has had problems with IBS and diarrhea her entire life.  She was referred to [facility] by Dr. Sotelo, but this never happened due to the pandemic.  We have discussed various treatment options today in detail.  She is willing to try a trial of Xifaxan.  It does not appear that she was ever ruled out for microscopic colitis, so we have discussed this today as well. 5-11-21 The patient presents today for follow-up of her IBS and diarrhea.  Since our last visit, she did take Xifaxan.  Her diarrhea has improved.  Now her stools have changed to where she is having several hard balls throughout the day.  She has multiple bowel movements throughout the day, and she never feels that she has a complete bowel movement.  She is taking 1 Metamucil tablet in the morning, but she forgets to take her Metamucil at night.  We have discussed adding MiraLAX in the morning along with increasing her Metamucil at night.  She is in agreement with this plan today.  Overall, she is feeling much better.  We have also discussed diet, but she is meeting with her primary care physician.  Her cholesterol has been elevated.  9/9/2021 Mrs. Truong presents for follow-up with IBS.  Since her last visit she started Metamucil twice daily and MiraLAX daily.  She subsequently developed urgent diffuse diarrhea with up to 10 bowel movements daily earlier this week.  She stopped the MiraLAX and took Imodium and did not have a bowel movement today.  She is taken Xifaxan twice and did not feel that it helped significantly.  She has been food journaling with no identifiable trigger to her symptoms.  Today we have had a long discussion regarding her symptoms.  Her IBS truly seems mixed.  She is taking her fiber daily.  She has never tried amitriptyline in the past.  She agrees to pursue colonoscopy if no relief.  MB 3/15/2022 Marisel presents for follow-up of irritable bowel syndrome with diarrhea predominance.  Since her last visit she started the amitriptyline 10 mg at night and we actually increased to 25 mg nightly.  She continues to have multiple urgent bowel movements usually in the morning.  She has failed Xifaxan, dicyclomine, Levsin in the past.  She is on Florastor daily.  Today we discussed her differential, I am concerned about microscopic colitis.  She agrees to repeat blood work and colonoscopy with random biopsies.  MB 4/29/2022 Mrs. Truong presents for follow-up of bloating, abdominal pain, and alternating constipation and diarrhea.  Since her last visit her repeat colonoscopy is normal with left-sided diverticular disease and normal random biopsies.  She is on amitriptyline 25 mg at night.  She states at times her stools are hard, she would then have these episodes of abdominal cramping with urgency and diarrhea.  Imodium does help but this seems to wipe her out for the day.  She is failed multiple medications in the past, she states that she cannot tolerate psyllium.  Viberzi helped in the distant past with Dr. Sotelo but this ultimately caused constipation and abdominal pain.  She does still have her gallbladder.  Her last EGD was done by Dr. Sotelo several years ago.  She has increased bloating as well.  She is on pantoprazole 40 mg daily.  She also takes olmesartan.  Today we have discussed her differential.  I suspect she still has a component of IBS mixed particularly since the amitriptyline 25 mg at night is the only thing that is ever helped her abdominal pain.  Her labs are normal, her colonoscopy is normal.  She declines repeat upper endoscopy for today.  She does agree to an ultrasound of her gallbladder.  She is concerned about EPI, we have discussed checking her stool studies for parasites and fecal elastase.  If her work-up is normal she would like to repeat a course of Xifaxan however she cannot tolerate 14 days, we did discuss a trial of a 3-day course.  Today she is doing about the same.  She continues to climb plaint of abdominal pain cramping and bowel regularity.  MB 8/26/2022 The patient presents today for follow-up of her IBS with mixed constipation and diarrhea.  Since her last visit, she did have stool studies that were positive for Salmonella.  She never did take any antibiotics.  Ultimately, she did take 10 days of Xifaxan.  While she is on the Xifaxan, she usually feels better, and this will last for several weeks, but then her symptoms recur.  She continues to have alternating diarrhea and constipation along with severe bloating.  She is extremely frustrated with her symptoms.  She has tried multiple medications.  Also, since our last visit, she has stopped her amitriptyline 25 mg at night.  She states she did never feel that this helped her significantly.  She is taking low-dose Metamucil.  Today, we have discussed increasing her Metamucil to 2 teaspoons.  We are going to retry dicyclomine.  Her main complaint, is that she goes to sleep okay, but she wakes up in the morning and has severe cramping with urgent diarrhea.  This does not happen every day, but it does happen a significant amount of the time.  We have discussed taking dicyclomine every morning, but decreasing this if she gets constipated.  She can also take another dicyclomine if she continues to have urgency.  We have also discussed that amitriptyline is a good medication for this, and I am concerned that her discontinuing this may have made her symptoms worse.  She also took Viberzi for some period of time.  This helped for a while, but then she felt that this caused her to become constipated.  She feels that some medicines cause her diarrhea, and some medicines cause her constipation.  I discussed that this may actually be her disease process, and not these medications.  We will see her back in the office in 3 or 4 months for further evaluation. 12/9/2022 The patient presents today for follow-up of her IBS with mixed constipation and diarrhea.  More recently, she has been having stools that have been harder.  She did have 1 episode of diarrhea, but this is now resolved.  She is taking low-dose Metamucil, but she is not taking this every day.  Today, we have discussed taking MiraLAX in the morning and Metamucil at night to control her symptoms somewhat better.  She is willing to give this a try.  If she does have worsening of her diarrhea, then I do want her to use the dicyclomine more regularly.  Her reflux has been well controlled with Protonix 40 mg in the morning.  She does occasionally have breakthrough with her reflux in the evening.  Today, we have discussed taking famotidine 20 mg at night as needed.  She is also had problems with her hemorrhoids.  She occasionally has bleeding and pain.  The Proctofoam has helped in the past, and we are going to call this in again today.  We will see her back in the office in 6 months for further evaluation. 5/9/2023 Marisel presents for follow-up of reflux and IBS.  She continues to struggle with her IBS symptoms.  She is not taking the MiraLAX as discussed previously.  She is taking 2-3 fiber tablets.  We have discussed ensuring these are psyllium fiber, I prefer the capsules.  I want her to restart the MiraLAX but try fourth or half capful daily.  She may use dicyclomine as needed.  She is on pantoprazole 20 mg daily with no significant breakthrough reflux.  She does agree to wean to Pepcid 20 mg daily then as needed.  Today she is doing about the same.  She denies any new GI complaints.  MB 3/14/2024 Marisel presents for evaluation of epigastric abdominal pain and reflux.  She is on pantoprazole 20 mg daily and famotidine 20 mg twice daily.  She still belching with a sour taste.  She does report bloating and early satiety.  Today we have discussed her symptoms, and concern for gastroparesis.  She has not had an upper endoscopy in some time.  Will schedule EGD to rule out gastritis, I am going to transition her from pantoprazole to esomeprazole 40 mg daily and famotidine 40 mg before supper.  Schedule gastric emptying study and consider nutrition referral pending her workup.  MB

## 2024-03-14 NOTE — PHYSICAL EXAM EXTREMITIES:
full range of motion , no edema Brow Lift Text: A midfrontal incision was made medially to the defect to allow access to the tissues just superior to the left eyebrow. Following careful dissection inferiorly in a supraperiosteal plane to the level of the left eyebrow, several 3-0 monocryl sutures were used to resuspend the eyebrow orbicularis oculi muscular unit to the superior frontal bone periosteum. This resulted in an appropriate reapproximation of static eyebrow symmetry and correction of the left brow ptosis.

## 2024-05-01 ENCOUNTER — WEB ENCOUNTER (OUTPATIENT)
Dept: URBAN - NONMETROPOLITAN AREA CLINIC 2 | Facility: CLINIC | Age: 79
End: 2024-05-01

## 2024-05-02 ENCOUNTER — WEB ENCOUNTER (OUTPATIENT)
Dept: URBAN - NONMETROPOLITAN AREA CLINIC 2 | Facility: CLINIC | Age: 79
End: 2024-05-02

## 2024-05-04 ENCOUNTER — WEB ENCOUNTER (OUTPATIENT)
Dept: URBAN - NONMETROPOLITAN AREA CLINIC 2 | Facility: CLINIC | Age: 79
End: 2024-05-04

## 2024-05-06 ENCOUNTER — WEB ENCOUNTER (OUTPATIENT)
Dept: URBAN - NONMETROPOLITAN AREA CLINIC 2 | Facility: CLINIC | Age: 79
End: 2024-05-06

## 2024-05-23 ENCOUNTER — TELEPHONE ENCOUNTER (OUTPATIENT)
Dept: URBAN - NONMETROPOLITAN AREA CLINIC 2 | Facility: CLINIC | Age: 79
End: 2024-05-23

## 2024-05-23 LAB
CALPROTECTIN, FECAL: 62
CLOSTRIDIUM DIFFICILE: (no result)
GIARDIA AG, EIA, STOOL: (no result)
LEUKOCYTES: (no result)
OVA AND PARASITES, CONC AND PERM SMEAR: (no result)
SALMONELLA AND SHIGELLA, CULTURE: (no result)
SHIGA TOXINS, EIA W/RFL TO E.COLI O157 CULTURE: (no result)

## 2024-05-26 ENCOUNTER — WEB ENCOUNTER (OUTPATIENT)
Dept: URBAN - NONMETROPOLITAN AREA CLINIC 2 | Facility: CLINIC | Age: 79
End: 2024-05-26

## 2024-06-28 ENCOUNTER — OFFICE VISIT (OUTPATIENT)
Dept: URBAN - NONMETROPOLITAN AREA SURGERY CENTER 1 | Facility: SURGERY CENTER | Age: 79
End: 2024-06-28

## 2024-07-22 ENCOUNTER — OFFICE VISIT (OUTPATIENT)
Dept: URBAN - NONMETROPOLITAN AREA SURGERY CENTER 1 | Facility: SURGERY CENTER | Age: 79
End: 2024-07-22

## 2024-07-25 ENCOUNTER — OFFICE VISIT (OUTPATIENT)
Dept: URBAN - NONMETROPOLITAN AREA CLINIC 2 | Facility: CLINIC | Age: 79
End: 2024-07-25

## 2024-08-06 ENCOUNTER — LAB OUTSIDE AN ENCOUNTER (OUTPATIENT)
Dept: URBAN - NONMETROPOLITAN AREA CLINIC 2 | Facility: CLINIC | Age: 79
End: 2024-08-06

## 2024-08-06 ENCOUNTER — OFFICE VISIT (OUTPATIENT)
Dept: URBAN - NONMETROPOLITAN AREA CLINIC 2 | Facility: CLINIC | Age: 79
End: 2024-08-06
Payer: MEDICARE

## 2024-08-06 ENCOUNTER — DASHBOARD ENCOUNTERS (OUTPATIENT)
Age: 79
End: 2024-08-06

## 2024-08-06 VITALS
WEIGHT: 114 LBS | DIASTOLIC BLOOD PRESSURE: 84 MMHG | HEART RATE: 59 BPM | SYSTOLIC BLOOD PRESSURE: 155 MMHG | HEIGHT: 64 IN | BODY MASS INDEX: 19.46 KG/M2

## 2024-08-06 DIAGNOSIS — K58.8 OTHER IRRITABLE BOWEL SYNDROME: ICD-10-CM

## 2024-08-06 DIAGNOSIS — R10.84 GENERALIZED ABDOMINAL PAIN: ICD-10-CM

## 2024-08-06 DIAGNOSIS — K21.9 GASTROESOPHAGEAL REFLUX DISEASE, UNSPECIFIED WHETHER ESOPHAGITIS PRESENT: ICD-10-CM

## 2024-08-06 PROBLEM — 102614006: Status: ACTIVE | Noted: 2024-08-06

## 2024-08-06 PROCEDURE — 99214 OFFICE O/P EST MOD 30 MIN: CPT | Performed by: NURSE PRACTITIONER

## 2024-08-06 RX ORDER — FAMOTIDINE 20 MG/1
TAKE 1 TABLET (20 MG) BY ORAL ROUTE 2 TIMES PER DAY FOR 30 DAYS TABLET ORAL TWICE A DAY
Qty: 180 TABLET | Refills: 3 | Status: ACTIVE | COMMUNITY
Start: 2022-12-09

## 2024-08-06 RX ORDER — ESOMEPRAZOLE MAGNESIUM 40 MG/1
1 CAPSULE CAPSULE, DELAYED RELEASE ORAL ONCE A DAY
Qty: 90 CAPSULE | Refills: 3 | Status: ACTIVE | COMMUNITY
Start: 2024-03-14

## 2024-08-06 RX ORDER — MAGNESIUM 200 MG
2 TABLETS WITH A MEAL TABLET ORAL
Status: ACTIVE | COMMUNITY

## 2024-08-06 RX ORDER — LEVOTHYROXINE SODIUM 0.09 MG/1
(PRIOR AUTH#:000008937753) TABLET ORAL
Qty: 90 DELAYED RELEASE TABLET | Status: ACTIVE | COMMUNITY

## 2024-08-06 RX ORDER — FAMOTIDINE 20 MG/1
TAKE 1 TABLET TABLET, FILM COATED ORAL TWICE DAILY
Qty: 180 TABLETS | Refills: 3 | OUTPATIENT
Start: 2024-08-06

## 2024-08-06 RX ORDER — OLMESARTAN MEDOXOMIL AND HYDROCHLOROTHIAZIDE 40; 12.5 MG/1; MG/1
(PRIOR AUTH#:000009179771) TABLET ORAL
Qty: 90 DELAYED RELEASE TABLET | Status: ACTIVE | COMMUNITY

## 2024-08-06 RX ORDER — LEVOTHYROXINE SODIUM 50 UG/1
1 TABLET IN THE MORNING ON AN EMPTY STOMACH TABLET ORAL ONCE A DAY
Status: ACTIVE | COMMUNITY

## 2024-08-06 RX ORDER — FAMOTIDINE 40 MG/1
1 TABLET AT BEDTIME TABLET, FILM COATED ORAL ONCE A DAY
Qty: 90 TABLET | Refills: 3 | Status: ACTIVE | COMMUNITY
Start: 2024-03-14

## 2024-08-06 RX ORDER — ROSUVASTATIN CALCIUM 5 MG/1
1 TABLET TABLET, FILM COATED ORAL ONCE A DAY
Status: ACTIVE | COMMUNITY

## 2024-08-06 RX ORDER — OLMESARTAN MEDOXOMIL-HYDROCHLOROTHIAZIDE 12.5; 4 MG/1; MG/1
1 TABLET TABLET, FILM COATED ORAL ONCE A DAY
Status: ACTIVE | COMMUNITY

## 2024-08-06 NOTE — HPI-TODAY'S VISIT:
3-16-21 The patient is a 75-year-old female who presents today for problems with IBS and diarrhea.  She was previously being seen by Dr. Ramses Sotelo.  He did a colonoscopy on her several years ago, but biopsies were not performed at that time.  She had been maintained on Viberzi, but she felt that this was causing too much constipation.  She actually became so constipated, that she had to go to the emergency room.  She stopped the Viberzi, and she was told to take it as needed.  This was not helping with her symptoms.  She is now having several hard bowel movements followed by urgent diarrhea.  She denies any blood in her stool.  She denies any weight loss.  She has never been on a specific FODMAP diet, but she has tried to eliminate some gluten and dairy.  She feels like she tries to eat fairly healthy.  She has never been tried on dicyclomine.  She has been on amitriptyline, and this did cause her some dizziness.  She also complains of bloating and gas today.  She is not taking any fiber on a regular basis.  She does take a probiotic occasionally.  She feels that IBgard helps with her symptoms more than any of the other medications that she has tried.  Overall, she is here today for second opinion.  She has had problems with IBS and diarrhea her entire life.  She was referred to [place] by Dr. Sotelo, but this never happened due to the pandemic.  We have discussed various treatment options today in detail.  She is willing to try a trial of Xifaxan.  It does not appear that she was ever ruled out for microscopic colitis, so we have discussed this today as well. 5-11-21 The patient presents today for follow-up of her IBS and diarrhea.  Since our last visit, she did take Xifaxan.  Her diarrhea has improved.  Now her stools have changed to where she is having several hard balls throughout the day.  She has multiple bowel movements throughout the day, and she never feels that she has a complete bowel movement.  She is taking 1 Metamucil tablet in the morning, but she forgets to take her Metamucil at night.  We have discussed adding MiraLAX in the morning along with increasing her Metamucil at night.  She is in agreement with this plan today.  Overall, she is feeling much better.  We have also discussed diet, but she is meeting with her primary care physician.  Her cholesterol has been elevated.  9/9/2021 Mrs. Truong presents for follow-up with IBS.  Since her last visit she started Metamucil twice daily and MiraLAX daily.  She subsequently developed urgent diffuse diarrhea with up to 10 bowel movements daily earlier this week.  She stopped the MiraLAX and took Imodium and did not have a bowel movement today.  She is taken Xifaxan twice and did not feel that it helped significantly.  She has been food journaling with no identifiable trigger to her symptoms.  Today we have had a long discussion regarding her symptoms.  Her IBS truly seems mixed.  She is taking her fiber daily.  She has never tried amitriptyline in the past.  She agrees to pursue colonoscopy if no relief.  MB 3/15/2022 Marisel presents for follow-up of irritable bowel syndrome with diarrhea predominance.  Since her last visit she started the amitriptyline 10 mg at night and we actually increased to 25 mg nightly.  She continues to have multiple urgent bowel movements usually in the morning.  She has failed Xifaxan, dicyclomine, Levsin in the past.  She is on Florastor daily.  Today we discussed her differential, I am concerned about microscopic colitis.  She agrees to repeat blood work and colonoscopy with random biopsies.  MB 4/29/2022 Mrs. Truong presents for follow-up of bloating, abdominal pain, and alternating constipation and diarrhea.  Since her last visit her repeat colonoscopy is normal with left-sided diverticular disease and normal random biopsies.  She is on amitriptyline 25 mg at night.  She states at times her stools are hard, she would then have these episodes of abdominal cramping with urgency and diarrhea.  Imodium does help but this seems to wipe her out for the day.  She is failed multiple medications in the past, she states that she cannot tolerate psyllium.  Viberzi helped in the distant past with Dr. Sotelo but this ultimately caused constipation and abdominal pain.  She does still have her gallbladder.  Her last EGD was done by Dr. Sotelo several years ago.  She has increased bloating as well.  She is on pantoprazole 40 mg daily.  She also takes olmesartan.  Today we have discussed her differential.  I suspect she still has a component of IBS mixed particularly since the amitriptyline 25 mg at night is the only thing that is ever helped her abdominal pain.  Her labs are normal, her colonoscopy is normal.  She declines repeat upper endoscopy for today.  She does agree to an ultrasound of her gallbladder.  She is concerned about EPI, we have discussed checking her stool studies for parasites and fecal elastase.  If her work-up is normal she would like to repeat a course of Xifaxan however she cannot tolerate 14 days, we did discuss a trial of a 3-day course.  Today she is doing about the same.  She continues to climb plaint of abdominal pain cramping and bowel regularity.  MB 8/26/2022 The patient presents today for follow-up of her IBS with mixed constipation and diarrhea.  Since her last visit, she did have stool studies that were positive for Salmonella.  She never did take any antibiotics.  Ultimately, she did take 10 days of Xifaxan.  While she is on the Xifaxan, she usually feels better, and this will last for several weeks, but then her symptoms recur.  She continues to have alternating diarrhea and constipation along with severe bloating.  She is extremely frustrated with her symptoms.  She has tried multiple medications.  Also, since our last visit, she has stopped her amitriptyline 25 mg at night.  She states she did never feel that this helped her significantly.  She is taking low-dose Metamucil.  Today, we have discussed increasing her Metamucil to 2 teaspoons.  We are going to retry dicyclomine.  Her main complaint, is that she goes to sleep okay, but she wakes up in the morning and has severe cramping with urgent diarrhea.  This does not happen every day, but it does happen a significant amount of the time.  We have discussed taking dicyclomine every morning, but decreasing this if she gets constipated.  She can also take another dicyclomine if she continues to have urgency.  We have also discussed that amitriptyline is a good medication for this, and I am concerned that her discontinuing this may have made her symptoms worse.  She also took Viberzi for some period of time.  This helped for a while, but then she felt that this caused her to become constipated.  She feels that some medicines cause her diarrhea, and some medicines cause her constipation.  I discussed that this may actually be her disease process, and not these medications.  We will see her back in the office in 3 or 4 months for further evaluation. 12/9/2022 The patient presents today for follow-up of her IBS with mixed constipation and diarrhea.  More recently, she has been having stools that have been harder.  She did have 1 episode of diarrhea, but this is now resolved.  She is taking low-dose Metamucil, but she is not taking this every day.  Today, we have discussed taking MiraLAX in the morning and Metamucil at night to control her symptoms somewhat better.  She is willing to give this a try.  If she does have worsening of her diarrhea, then I do want her to use the dicyclomine more regularly.  Her reflux has been well controlled with Protonix 40 mg in the morning.  She does occasionally have breakthrough with her reflux in the evening.  Today, we have discussed taking famotidine 20 mg at night as needed.  She is also had problems with her hemorrhoids.  She occasionally has bleeding and pain.  The Proctofoam has helped in the past, and we are going to call this in again today.  We will see her back in the office in 6 months for further evaluation. 5/9/2023 Marisel presents for follow-up of reflux and IBS.  She continues to struggle with her IBS symptoms.  She is not taking the MiraLAX as discussed previously.  She is taking 2-3 fiber tablets.  We have discussed ensuring these are psyllium fiber, I prefer the capsules.  I want her to restart the MiraLAX but try fourth or half capful daily.  She may use dicyclomine as needed.  She is on pantoprazole 20 mg daily with no significant breakthrough reflux.  She does agree to wean to Pepcid 20 mg daily then as needed.  Today she is doing about the same.  She denies any new GI complaints.  MB 3/14/2024 Marisel presents for evaluation of epigastric abdominal pain and reflux.  She is on pantoprazole 20 mg daily and famotidine 20 mg twice daily.  She still belching with a sour taste.  She does report bloating and early satiety.  Today we have discussed her symptoms, and concern for gastroparesis.  She has not had an upper endoscopy in some time.  Will schedule EGD to rule out gastritis, I am going to transition her from pantoprazole to esomeprazole 40 mg daily and famotidine 40 mg before supper.  Schedule gastric emptying study and consider nutrition referral pending her workup.  MB 8/6/2024 Marisel presents for follow-up of reflux both bloating and IBS.  Since her last visit her gastric emptying study shows get rapid gastric emptying.  She never feels better on PPI therapy.  We discussed just using Pepcid as needed for bloating and reflux.  Her bowels are still irregular.  Recently she has had a change in bowel habits with more constipation.  Her husban passed abruptly after being diagnosed with a brain tumor and passing shortly after.  They have been  for 55 years.  Her colonoscopy in 2022 was fairly normal.  Her main complaint today is severe abdominal cramping that occurs acutely.  She is very concerned about the symptoms.  Will schedule contrasted imaging rule out pancreatic biliary issue.  If negative consider a course of Xifaxan.MB

## 2024-08-12 ENCOUNTER — WEB ENCOUNTER (OUTPATIENT)
Dept: URBAN - NONMETROPOLITAN AREA CLINIC 2 | Facility: CLINIC | Age: 79
End: 2024-08-12

## 2024-08-12 RX ORDER — HYDROCORTISONE 25 MG/G
1 APPLICATION CREAM TOPICAL TWICE A DAY
Qty: 3 | Refills: 3 | OUTPATIENT
Start: 2024-08-12 | End: 2025-08-07

## 2024-08-19 ENCOUNTER — WEB ENCOUNTER (OUTPATIENT)
Dept: URBAN - NONMETROPOLITAN AREA CLINIC 2 | Facility: CLINIC | Age: 79
End: 2024-08-19

## 2024-08-19 RX ORDER — ONDANSETRON HYDROCHLORIDE 4 MG/1
1 TABLET TABLET, FILM COATED ORAL
Qty: 60 TABLET | Refills: 3 | OUTPATIENT
Start: 2024-08-19

## 2024-08-27 ENCOUNTER — WEB ENCOUNTER (OUTPATIENT)
Dept: URBAN - NONMETROPOLITAN AREA CLINIC 2 | Facility: CLINIC | Age: 79
End: 2024-08-27

## 2024-08-27 RX ORDER — ESOMEPRAZOLE MAGNESIUM 40 MG/1
1 CAPSULE CAPSULE, DELAYED RELEASE PELLETS ORAL ONCE A DAY
Qty: 90 CAPSULE | Refills: 3 | OUTPATIENT
Start: 2024-08-28

## 2024-08-27 RX ORDER — FAMOTIDINE 40 MG/1
1 TABLET DAILY BEFORE SUPPER TABLET, FILM COATED ORAL ONCE A DAY
Qty: 90 TABLET | Refills: 3 | OUTPATIENT
Start: 2024-08-28

## 2024-08-28 ENCOUNTER — WEB ENCOUNTER (OUTPATIENT)
Dept: URBAN - NONMETROPOLITAN AREA CLINIC 2 | Facility: CLINIC | Age: 79
End: 2024-08-28

## 2024-08-28 ENCOUNTER — TELEPHONE ENCOUNTER (OUTPATIENT)
Dept: URBAN - NONMETROPOLITAN AREA CLINIC 2 | Facility: CLINIC | Age: 79
End: 2024-08-28

## 2024-09-10 ENCOUNTER — WEB ENCOUNTER (OUTPATIENT)
Dept: URBAN - NONMETROPOLITAN AREA CLINIC 2 | Facility: CLINIC | Age: 79
End: 2024-09-10

## 2024-09-10 RX ORDER — PROMETHAZINE HYDROCHLORIDE 25 MG/1
1 TABLET AS NEEDED TABLET ORAL
Qty: 6 | Refills: 0 | OUTPATIENT
Start: 2024-09-11 | End: 2024-09-14

## 2024-09-11 ENCOUNTER — WEB ENCOUNTER (OUTPATIENT)
Dept: URBAN - NONMETROPOLITAN AREA CLINIC 2 | Facility: CLINIC | Age: 79
End: 2024-09-11

## 2025-02-18 ENCOUNTER — LAB OUTSIDE AN ENCOUNTER (OUTPATIENT)
Dept: URBAN - NONMETROPOLITAN AREA CLINIC 2 | Facility: CLINIC | Age: 80
End: 2025-02-18

## 2025-02-18 ENCOUNTER — OFFICE VISIT (OUTPATIENT)
Dept: URBAN - NONMETROPOLITAN AREA CLINIC 2 | Facility: CLINIC | Age: 80
End: 2025-02-18
Payer: MEDICARE

## 2025-02-18 VITALS
DIASTOLIC BLOOD PRESSURE: 80 MMHG | SYSTOLIC BLOOD PRESSURE: 155 MMHG | HEART RATE: 74 BPM | HEIGHT: 64 IN | WEIGHT: 116 LBS | BODY MASS INDEX: 19.81 KG/M2

## 2025-02-18 DIAGNOSIS — K58.2 IRRITABLE BOWEL SYNDROME WITH BOTH CONSTIPATION AND DIARRHEA: ICD-10-CM

## 2025-02-18 DIAGNOSIS — K57.90 DIVERTICULOSIS: ICD-10-CM

## 2025-02-18 DIAGNOSIS — K21.9 GASTROESOPHAGEAL REFLUX DISEASE, UNSPECIFIED WHETHER ESOPHAGITIS PRESENT: ICD-10-CM

## 2025-02-18 DIAGNOSIS — Z12.11 SCREENING FOR COLON CANCER: ICD-10-CM

## 2025-02-18 DIAGNOSIS — R13.19 ESOPHAGEAL DYSPHAGIA: ICD-10-CM

## 2025-02-18 DIAGNOSIS — K86.2 PANCREATIC CYST: ICD-10-CM

## 2025-02-18 DIAGNOSIS — K64.9 HEMORRHOIDS, UNSPECIFIED HEMORRHOID TYPE: ICD-10-CM

## 2025-02-18 DIAGNOSIS — R14.0 BLOATING: ICD-10-CM

## 2025-02-18 PROBLEM — 31258000: Status: ACTIVE | Noted: 2025-02-18

## 2025-02-18 PROBLEM — 40890009: Status: ACTIVE | Noted: 2025-02-18

## 2025-02-18 PROCEDURE — 99214 OFFICE O/P EST MOD 30 MIN: CPT | Performed by: NURSE PRACTITIONER

## 2025-02-18 RX ORDER — FAMOTIDINE 40 MG/1
1 TABLET DAILY BEFORE SUPPER TABLET, FILM COATED ORAL ONCE A DAY
Qty: 90 TABLET | Refills: 3 | Status: ACTIVE | COMMUNITY
Start: 2024-08-28

## 2025-02-18 RX ORDER — OLMESARTAN MEDOXOMIL AND HYDROCHLOROTHIAZIDE 40; 12.5 MG/1; MG/1
(PRIOR AUTH#:000009179771) TABLET ORAL
Qty: 90 DELAYED RELEASE TABLET | Status: ACTIVE | COMMUNITY

## 2025-02-18 RX ORDER — ESOMEPRAZOLE MAGNESIUM 40 MG/1
1 CAPSULE CAPSULE, DELAYED RELEASE PELLETS ORAL ONCE A DAY
Qty: 90 CAPSULE | Refills: 3 | Status: ACTIVE | COMMUNITY
Start: 2024-08-28

## 2025-02-18 RX ORDER — ROSUVASTATIN CALCIUM 5 MG/1
1 TABLET TABLET, FILM COATED ORAL ONCE A DAY
Status: ACTIVE | COMMUNITY

## 2025-02-18 RX ORDER — ESOMEPRAZOLE MAGNESIUM 40 MG/1
1 CAPSULE CAPSULE, DELAYED RELEASE ORAL ONCE A DAY
Qty: 90 CAPSULE | Refills: 3 | Status: ACTIVE | COMMUNITY
Start: 2024-03-14

## 2025-02-18 RX ORDER — OMEPRAZOLE 10 MG/1
1 CAPSULE 1/2 TO 1 HOUR BEFORE MORNING MEAL CAPSULE, DELAYED RELEASE ORAL ONCE A DAY
Qty: 90 CAPSULES | Refills: 3 | OUTPATIENT
Start: 2025-02-18

## 2025-02-18 RX ORDER — HYDROCORTISONE 25 MG/G
1 APPLICATION CREAM TOPICAL TWICE A DAY
Qty: 3 | Refills: 3 | Status: ACTIVE | COMMUNITY
Start: 2024-08-12 | End: 2025-08-07

## 2025-02-18 RX ORDER — LEVOTHYROXINE SODIUM 0.09 MG/1
(PRIOR AUTH#:000008937753) TABLET ORAL
Qty: 90 DELAYED RELEASE TABLET | Status: ACTIVE | COMMUNITY

## 2025-02-18 RX ORDER — MAGNESIUM 200 MG
2 TABLETS WITH A MEAL TABLET ORAL
Status: ACTIVE | COMMUNITY

## 2025-02-18 RX ORDER — ONDANSETRON HYDROCHLORIDE 4 MG/1
1 TABLET TABLET, FILM COATED ORAL
Qty: 60 TABLET | Refills: 3 | Status: ACTIVE | COMMUNITY
Start: 2024-08-19

## 2025-02-18 RX ORDER — FAMOTIDINE 20 MG/1
TAKE 1 TABLET TABLET, FILM COATED ORAL TWICE DAILY
Qty: 180 TABLETS | Refills: 3 | Status: ACTIVE | COMMUNITY
Start: 2024-08-06

## 2025-02-18 RX ORDER — OLMESARTAN MEDOXOMIL-HYDROCHLOROTHIAZIDE 12.5; 4 MG/1; MG/1
1 TABLET TABLET, FILM COATED ORAL ONCE A DAY
Status: ACTIVE | COMMUNITY

## 2025-02-18 RX ORDER — FAMOTIDINE 40 MG/1
1 TABLET AT BEDTIME TABLET, FILM COATED ORAL ONCE A DAY
Qty: 90 TABLET | Refills: 3 | Status: ACTIVE | COMMUNITY
Start: 2024-03-14

## 2025-02-18 RX ORDER — FAMOTIDINE 20 MG/1
TAKE 1 TABLET (20 MG) BY ORAL ROUTE 2 TIMES PER DAY FOR 30 DAYS TABLET ORAL TWICE A DAY
Qty: 180 TABLET | Refills: 3 | Status: ACTIVE | COMMUNITY
Start: 2022-12-09

## 2025-02-18 RX ORDER — LEVOTHYROXINE SODIUM 50 UG/1
1 TABLET IN THE MORNING ON AN EMPTY STOMACH TABLET ORAL ONCE A DAY
Status: ACTIVE | COMMUNITY

## 2025-02-18 NOTE — HPI-TODAY'S VISIT:
3-16-21 The patient is a 75-year-old female who presents today for problems with IBS and diarrhea.  She was previously being seen by Dr. Ramses Sotelo.  He did a colonoscopy on her several years ago, but biopsies were not performed at that time.  She had been maintained on Viberzi, but she felt that this was causing too much constipation.  She actually became so constipated, that she had to go to the emergency room.  She stopped the Viberzi, and she was told to take it as needed.  This was not helping with her symptoms.  She is now having several hard bowel movements followed by urgent diarrhea.  She denies any blood in her stool.  She denies any weight loss.  She has never been on a specific FODMAP diet, but she has tried to eliminate some gluten and dairy.  She feels like she tries to eat fairly healthy.  She has never been tried on dicyclomine.  She has been on amitriptyline, and this did cause her some dizziness.  She also complains of bloating and gas today.  She is not taking any fiber on a regular basis.  She does take a probiotic occasionally.  She feels that IBgard helps with her symptoms more than any of the other medications that she has tried.  Overall, she is here today for second opinion.  She has had problems with IBS and diarrhea her entire life.  She was referred to [facility] by Dr. Sotelo, but this never happened due to the pandemic.  We have discussed various treatment options today in detail.  She is willing to try a trial of Xifaxan.  It does not appear that she was ever ruled out for microscopic colitis, so we have discussed this today as well. 5-11-21 The patient presents today for follow-up of her IBS and diarrhea.  Since our last visit, she did take Xifaxan.  Her diarrhea has improved.  Now her stools have changed to where she is having several hard balls throughout the day.  She has multiple bowel movements throughout the day, and she never feels that she has a complete bowel movement.  She is taking 1 Metamucil tablet in the morning, but she forgets to take her Metamucil at night.  We have discussed adding MiraLAX in the morning along with increasing her Metamucil at night.  She is in agreement with this plan today.  Overall, she is feeling much better.  We have also discussed diet, but she is meeting with her primary care physician.  Her cholesterol has been elevated.  9/9/2021 Mrs. Truong presents for follow-up with IBS.  Since her last visit she started Metamucil twice daily and MiraLAX daily.  She subsequently developed urgent diffuse diarrhea with up to 10 bowel movements daily earlier this week.  She stopped the MiraLAX and took Imodium and did not have a bowel movement today.  She is taken Xifaxan twice and did not feel that it helped significantly.  She has been food journaling with no identifiable trigger to her symptoms.  Today we have had a long discussion regarding her symptoms.  Her IBS truly seems mixed.  She is taking her fiber daily.  She has never tried amitriptyline in the past.  She agrees to pursue colonoscopy if no relief.  MB 3/15/2022 Marisel presents for follow-up of irritable bowel syndrome with diarrhea predominance.  Since her last visit she started the amitriptyline 10 mg at night and we actually increased to 25 mg nightly.  She continues to have multiple urgent bowel movements usually in the morning.  She has failed Xifaxan, dicyclomine, Levsin in the past.  She is on Florastor daily.  Today we discussed her differential, I am concerned about microscopic colitis.  She agrees to repeat blood work and colonoscopy with random biopsies.  MB 4/29/2022 Mrs. Truong presents for follow-up of bloating, abdominal pain, and alternating constipation and diarrhea.  Since her last visit her repeat colonoscopy is normal with left-sided diverticular disease and normal random biopsies.  She is on amitriptyline 25 mg at night.  She states at times her stools are hard, she would then have these episodes of abdominal cramping with urgency and diarrhea.  Imodium does help but this seems to wipe her out for the day.  She is failed multiple medications in the past, she states that she cannot tolerate psyllium.  Viberzi helped in the distant past with Dr. Sotelo but this ultimately caused constipation and abdominal pain.  She does still have her gallbladder.  Her last EGD was done by Dr. Sotelo several years ago.  She has increased bloating as well.  She is on pantoprazole 40 mg daily.  She also takes olmesartan.  Today we have discussed her differential.  I suspect she still has a component of IBS mixed particularly since the amitriptyline 25 mg at night is the only thing that is ever helped her abdominal pain.  Her labs are normal, her colonoscopy is normal.  She declines repeat upper endoscopy for today.  She does agree to an ultrasound of her gallbladder.  She is concerned about EPI, we have discussed checking her stool studies for parasites and fecal elastase.  If her work-up is normal she would like to repeat a course of Xifaxan however she cannot tolerate 14 days, we did discuss a trial of a 3-day course.  Today she is doing about the same.  She continues to climb plaint of abdominal pain cramping and bowel regularity.  MB 8/26/2022 The patient presents today for follow-up of her IBS with mixed constipation and diarrhea.  Since her last visit, she did have stool studies that were positive for Salmonella.  She never did take any antibiotics.  Ultimately, she did take 10 days of Xifaxan.  While she is on the Xifaxan, she usually feels better, and this will last for several weeks, but then her symptoms recur.  She continues to have alternating diarrhea and constipation along with severe bloating.  She is extremely frustrated with her symptoms.  She has tried multiple medications.  Also, since our last visit, she has stopped her amitriptyline 25 mg at night.  She states she did never feel that this helped her significantly.  She is taking low-dose Metamucil.  Today, we have discussed increasing her Metamucil to 2 teaspoons.  We are going to retry dicyclomine.  Her main complaint, is that she goes to sleep okay, but she wakes up in the morning and has severe cramping with urgent diarrhea.  This does not happen every day, but it does happen a significant amount of the time.  We have discussed taking dicyclomine every morning, but decreasing this if she gets constipated.  She can also take another dicyclomine if she continues to have urgency.  We have also discussed that amitriptyline is a good medication for this, and I am concerned that her discontinuing this may have made her symptoms worse.  She also took Viberzi for some period of time.  This helped for a while, but then she felt that this caused her to become constipated.  She feels that some medicines cause her diarrhea, and some medicines cause her constipation.  I discussed that this may actually be her disease process, and not these medications.  We will see her back in the office in 3 or 4 months for further evaluation. 12/9/2022 The patient presents today for follow-up of her IBS with mixed constipation and diarrhea.  More recently, she has been having stools that have been harder.  She did have 1 episode of diarrhea, but this is now resolved.  She is taking low-dose Metamucil, but she is not taking this every day.  Today, we have discussed taking MiraLAX in the morning and Metamucil at night to control her symptoms somewhat better.  She is willing to give this a try.  If she does have worsening of her diarrhea, then I do want her to use the dicyclomine more regularly.  Her reflux has been well controlled with Protonix 40 mg in the morning.  She does occasionally have breakthrough with her reflux in the evening.  Today, we have discussed taking famotidine 20 mg at night as needed.  She is also had problems with her hemorrhoids.  She occasionally has bleeding and pain.  The Proctofoam has helped in the past, and we are going to call this in again today.  We will see her back in the office in 6 months for further evaluation. 5/9/2023 Marisel presents for follow-up of reflux and IBS.  She continues to struggle with her IBS symptoms.  She is not taking the MiraLAX as discussed previously.  She is taking 2-3 fiber tablets.  We have discussed ensuring these are psyllium fiber, I prefer the capsules.  I want her to restart the MiraLAX but try fourth or half capful daily.  She may use dicyclomine as needed.  She is on pantoprazole 20 mg daily with no significant breakthrough reflux.  She does agree to wean to Pepcid 20 mg daily then as needed.  Today she is doing about the same.  She denies any new GI complaints.  MB 3/14/2024 Marisel presents for evaluation of epigastric abdominal pain and reflux.  She is on pantoprazole 20 mg daily and famotidine 20 mg twice daily.  She still belching with a sour taste.  She does report bloating and early satiety.  Today we have discussed her symptoms, and concern for gastroparesis.  She has not had an upper endoscopy in some time.  Will schedule EGD to rule out gastritis, I am going to transition her from pantoprazole to esomeprazole 40 mg daily and famotidine 40 mg before supper.  Schedule gastric emptying study and consider nutrition referral pending her workup.  MB 8/6/2024 Marisel presents for follow-up of reflux both bloating and IBS.  Since her last visit her gastric emptying study shows get rapid gastric emptying.  She never feels better on PPI therapy.  We discussed just using Pepcid as needed for bloating and reflux.  Her bowels are still irregular.  Recently she has had a change in bowel habits with more constipation.  Her husban passed abruptly after being diagnosed with a brain tumor and passing shortly after.  They have been  for 55 years.  Her colonoscopy in 2022 was fairly normal.  Her main complaint today is severe abdominal cramping that occurs acutely.  She is very concerned about the symptoms.  Will schedule contrasted imaging rule out pancreatic biliary issue.  If negative consider a course of Xifaxan.MB 2/18/2025 Marisel presents for follow-up.  Since her last visit her CT scan shows a large stool burden and a 10 mm pancreatic cyst that is been stable since 2018.  Her IBS symptoms continue to fluctuate.  She is taking psyllium, I do want her to add in a teaspoon of MiraLAX daily.  She does complain of persistent indigestion.  As omeprazole helps her symptoms but her symptoms return off therapy.  She agrees to try the low-dose omeprazole 10 mg daily.  She reports dysphagia to solids.  She would like to pursue an esophagram, she could agrees to consider an EGD if there is an abnormality, transportation has become an issue since her  passing.  Today she continues to have multiple GI complaints.  MB

## 2025-03-13 ENCOUNTER — TELEPHONE ENCOUNTER (OUTPATIENT)
Dept: URBAN - NONMETROPOLITAN AREA CLINIC 2 | Facility: CLINIC | Age: 80
End: 2025-03-13

## 2025-03-16 ENCOUNTER — WEB ENCOUNTER (OUTPATIENT)
Dept: URBAN - NONMETROPOLITAN AREA CLINIC 2 | Facility: CLINIC | Age: 80
End: 2025-03-16

## 2025-07-15 ENCOUNTER — LAB OUTSIDE AN ENCOUNTER (OUTPATIENT)
Dept: URBAN - NONMETROPOLITAN AREA CLINIC 2 | Facility: CLINIC | Age: 80
End: 2025-07-15

## 2025-07-15 ENCOUNTER — TELEPHONE ENCOUNTER (OUTPATIENT)
Dept: URBAN - NONMETROPOLITAN AREA CLINIC 2 | Facility: CLINIC | Age: 80
End: 2025-07-15

## 2025-07-15 ENCOUNTER — OFFICE VISIT (OUTPATIENT)
Dept: URBAN - NONMETROPOLITAN AREA CLINIC 2 | Facility: CLINIC | Age: 80
End: 2025-07-15
Payer: MEDICARE

## 2025-07-15 DIAGNOSIS — K86.2 PANCREATIC CYST: ICD-10-CM

## 2025-07-15 DIAGNOSIS — R13.19 ESOPHAGEAL DYSPHAGIA: ICD-10-CM

## 2025-07-15 DIAGNOSIS — K21.9 GASTROESOPHAGEAL REFLUX DISEASE, UNSPECIFIED WHETHER ESOPHAGITIS PRESENT: ICD-10-CM

## 2025-07-15 DIAGNOSIS — Z12.11 SCREENING FOR COLON CANCER: ICD-10-CM

## 2025-07-15 DIAGNOSIS — K57.90 DIVERTICULOSIS: ICD-10-CM

## 2025-07-15 DIAGNOSIS — K58.2 IRRITABLE BOWEL SYNDROME WITH BOTH CONSTIPATION AND DIARRHEA: ICD-10-CM

## 2025-07-15 DIAGNOSIS — K64.9 HEMORRHOIDS, UNSPECIFIED HEMORRHOID TYPE: ICD-10-CM

## 2025-07-15 DIAGNOSIS — R14.0 BLOATING: ICD-10-CM

## 2025-07-15 PROCEDURE — 99214 OFFICE O/P EST MOD 30 MIN: CPT | Performed by: NURSE PRACTITIONER

## 2025-07-15 RX ORDER — ROSUVASTATIN CALCIUM 5 MG/1
1 TABLET TABLET, FILM COATED ORAL ONCE A DAY
Status: ACTIVE | COMMUNITY

## 2025-07-15 RX ORDER — FAMOTIDINE 20 MG/1
TAKE 1 TABLET (20 MG) BY ORAL ROUTE 2 TIMES PER DAY FOR 30 DAYS TABLET ORAL TWICE A DAY
Qty: 180 TABLET | Refills: 3 | Status: ACTIVE | COMMUNITY
Start: 2022-12-09

## 2025-07-15 RX ORDER — ONDANSETRON HYDROCHLORIDE 4 MG/1
1 TABLET TABLET, FILM COATED ORAL
Qty: 60 TABLET | Refills: 3 | Status: ACTIVE | COMMUNITY
Start: 2024-08-19

## 2025-07-15 RX ORDER — OLMESARTAN MEDOXOMIL AND HYDROCHLOROTHIAZIDE 40; 12.5 MG/1; MG/1
(PRIOR AUTH#:000009179771) TABLET ORAL
Qty: 90 DELAYED RELEASE TABLET | Status: ACTIVE | COMMUNITY

## 2025-07-15 RX ORDER — LEVOTHYROXINE SODIUM 75 UG/1
1 TABLET IN THE MORNING ON AN EMPTY STOMACH TABLET ORAL ONCE A DAY
Status: ACTIVE | COMMUNITY

## 2025-07-15 RX ORDER — ESOMEPRAZOLE MAGNESIUM 40 MG/1
1 CAPSULE CAPSULE, DELAYED RELEASE ORAL ONCE A DAY
Qty: 90 CAPSULE | Refills: 3 | Status: ACTIVE | COMMUNITY
Start: 2024-03-14

## 2025-07-15 RX ORDER — OLMESARTAN MEDOXOMIL-HYDROCHLOROTHIAZIDE 12.5; 4 MG/1; MG/1
1 TABLET TABLET, FILM COATED ORAL ONCE A DAY
Status: ACTIVE | COMMUNITY

## 2025-07-15 RX ORDER — OMEPRAZOLE 10 MG/1
1 CAPSULE 1/2 TO 1 HOUR BEFORE MORNING MEAL CAPSULE, DELAYED RELEASE ORAL ONCE A DAY
Qty: 90 CAPSULES | Refills: 3
Start: 2025-02-18

## 2025-07-15 RX ORDER — OMEPRAZOLE 10 MG/1
1 CAPSULE 1/2 TO 1 HOUR BEFORE MORNING MEAL CAPSULE, DELAYED RELEASE ORAL PRN
Refills: 3 | Status: ACTIVE | COMMUNITY
Start: 2025-02-18

## 2025-07-15 RX ORDER — HYDROCORTISONE 25 MG/G
1 APPLICATION CREAM TOPICAL TWICE A DAY
Qty: 3 | Refills: 3 | Status: ACTIVE | COMMUNITY
Start: 2024-08-12 | End: 2025-08-07

## 2025-07-15 RX ORDER — FAMOTIDINE 40 MG/1
1 TABLET AT BEDTIME TABLET, FILM COATED ORAL PRN
Refills: 3 | Status: ACTIVE | COMMUNITY
Start: 2024-03-14

## 2025-07-15 RX ORDER — LEVOTHYROXINE SODIUM 0.09 MG/1
(PRIOR AUTH#:000008937753) TABLET ORAL
Qty: 90 DELAYED RELEASE TABLET | Status: DISCONTINUED | COMMUNITY

## 2025-07-15 RX ORDER — FAMOTIDINE 20 MG/1
TAKE 1 TABLET TABLET, FILM COATED ORAL TWICE DAILY
Qty: 180 TABLETS | Refills: 3 | Status: ACTIVE | COMMUNITY
Start: 2024-08-06

## 2025-07-15 RX ORDER — FAMOTIDINE 40 MG/1
1 TABLET DAILY BEFORE SUPPER TABLET, FILM COATED ORAL ONCE A DAY
Qty: 90 TABLET | Refills: 3 | Status: ACTIVE | COMMUNITY
Start: 2024-08-28

## 2025-07-15 RX ORDER — METRONIDAZOLE 7.5 MG/G
1 APPLICATION CREAM TOPICAL TWICE A DAY
Qty: 1 | Refills: 1 | OUTPATIENT
Start: 2025-07-15 | End: 2025-08-12

## 2025-07-15 RX ORDER — MAGNESIUM 200 MG
2 TABLETS WITH A MEAL TABLET ORAL
Status: ACTIVE | COMMUNITY

## 2025-07-15 RX ORDER — ESOMEPRAZOLE MAGNESIUM 40 MG/1
1 CAPSULE CAPSULE, DELAYED RELEASE PELLETS ORAL ONCE A DAY
Qty: 90 CAPSULE | Refills: 3 | Status: ACTIVE | COMMUNITY
Start: 2024-08-28

## 2025-07-15 NOTE — HPI-TODAY'S VISIT:
7/15/2025 Marisel Truong, a 79-year-old female, presents for follow-up of chronic gastrointestinal symptoms. She reports ongoing alternating constipation and diarrhea, with recent episodes of diarrhea up to five times in a day and associated stomach cramps. The most recent flare occurred today, with a prior episode at the end of June linked to family-related stress. She manages her symptoms with Imodium as needed and adjusts Miralax to every other day, but remains concerned about the unpredictability of her bowel habits, especially with upcoming activities. She is proactive in exploring dietary changes and is interested in additional support through a new program. Marisel also notes worsening heartburn and reflux after dietary changes, experiencing discomfort even after small meals. She previously discontinued omeprazole 10 mg daily when her symptoms improved but now uses esomeprazole 20 mg as needed. She is considering resuming daily omeprazole and references a recent esophagram showing mild presbyesophagus without structural abnormalities. Additionally, she describes rectal discomfort and a sensation of tears with straining during bowel movements, seeking advice for hemorrhoid/fissure management. MB

## 2025-07-17 ENCOUNTER — TELEPHONE ENCOUNTER (OUTPATIENT)
Dept: URBAN - NONMETROPOLITAN AREA CLINIC 2 | Facility: CLINIC | Age: 80
End: 2025-07-17

## 2025-08-05 ENCOUNTER — P2P PATIENT RECORD (OUTPATIENT)
Age: 80
End: 2025-08-05

## 2025-08-06 ENCOUNTER — TELEPHONE ENCOUNTER (OUTPATIENT)
Dept: URBAN - NONMETROPOLITAN AREA CLINIC 1 | Facility: CLINIC | Age: 80
End: 2025-08-06

## 2025-08-12 ENCOUNTER — TELEPHONE ENCOUNTER (OUTPATIENT)
Dept: URBAN - NONMETROPOLITAN AREA CLINIC 2 | Facility: CLINIC | Age: 80
End: 2025-08-12
Payer: MEDICARE

## 2025-08-12 DIAGNOSIS — K58.2 IRRITABLE BOWEL SYNDROME WITH BOTH CONSTIPATION AND DIARRHEA: ICD-10-CM

## 2025-08-12 PROCEDURE — 99453 REM MNTR PHYSIOL PARAM SETUP: CPT | Performed by: INTERNAL MEDICINE

## 2025-08-12 PROCEDURE — 99458 RPM TX MGMT EA ADDL 20 MIN: CPT | Performed by: INTERNAL MEDICINE

## 2025-08-12 PROCEDURE — 99457 RPM TX MGMT 1ST 20 MIN: CPT | Performed by: INTERNAL MEDICINE
